# Patient Record
Sex: FEMALE | Race: WHITE | NOT HISPANIC OR LATINO | Employment: STUDENT | URBAN - METROPOLITAN AREA
[De-identification: names, ages, dates, MRNs, and addresses within clinical notes are randomized per-mention and may not be internally consistent; named-entity substitution may affect disease eponyms.]

---
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
## 2021-01-15 DIAGNOSIS — J03.01 RECURRENT STREPTOCOCCAL TONSILLITIS: Primary | ICD-10-CM

## 2021-03-19 ENCOUNTER — TELEMEDICINE (OUTPATIENT)
Dept: FAMILY MEDICINE CLINIC | Facility: CLINIC | Age: 21
End: 2021-03-19

## 2021-03-19 DIAGNOSIS — M94.0 COSTOCHONDRITIS, ACUTE: Primary | ICD-10-CM

## 2021-03-19 PROCEDURE — 99213 OFFICE O/P EST LOW 20 MIN: CPT | Performed by: FAMILY MEDICINE

## 2021-03-19 RX ORDER — CYCLOBENZAPRINE HCL 10 MG
10 TABLET ORAL 3 TIMES DAILY PRN
Qty: 90 TABLET | Refills: 0 | Status: SHIPPED | OUTPATIENT
Start: 2021-03-19 | End: 2021-06-14

## 2021-03-19 NOTE — PROGRESS NOTES
Subjective   Mayo Dietrich is a 20 y.o. female.     Chief Complaint   Patient presents with   • Chest Pain       History of Present Illness   Rib pain pain for 3 days, achy. Worse with a full breath. No recent URI. Has had this before and was diagnosed with costochondritis and this feels the same.     The following portions of the patient's history were reviewed and updated as appropriate: allergies, current medications, past family history, past medical history, past social history, past surgical history and problem list.    Past Medical History:   Diagnosis Date   • Allergic        Past Surgical History:   Procedure Laterality Date   • FOOT SURGERY     • NASAL SEPTUM SURGERY     • WISDOM TOOTH EXTRACTION         Family History   Problem Relation Age of Onset   • Hypertension Mother    • Other Mother         hemachromatosis   • No Known Problems Father        Social History     Socioeconomic History   • Marital status: Single     Spouse name: Not on file   • Number of children: Not on file   • Years of education: Not on file   • Highest education level: Not on file   Tobacco Use   • Smoking status: Passive Smoke Exposure - Never Smoker   • Smokeless tobacco: Never Used   Substance and Sexual Activity   • Alcohol use: Defer   • Drug use: Defer   • Sexual activity: Defer       Review of Systems   Constitutional: Negative for fever.   Respiratory: Negative for shortness of breath.        Objective   There were no vitals taken for this visit.  There is no height or weight on file to calculate BMI.  Physical Exam  Constitutional:       General: She is not in acute distress.     Appearance: Normal appearance.   Neurological:      General: No focal deficit present.      Mental Status: She is alert and oriented to person, place, and time.   Psychiatric:         Mood and Affect: Mood normal.         Behavior: Behavior normal.           Assessment/Plan   Diagnoses and all orders for this visit:    1. Costochondritis, acute  (Primary)  -     cyclobenzaprine (FLEXERIL) 10 MG tablet; Take 1 tablet by mouth 3 (Three) Times a Day As Needed for Muscle Spasms.  Dispense: 90 tablet; Refill: 0        Advised anti-inflammatories and to avoid risk factors.  Follow-up if worse or no better.  Consent to video visit and spent 9 minutes.

## 2021-04-16 ENCOUNTER — BULK ORDERING (OUTPATIENT)
Dept: CASE MANAGEMENT | Facility: OTHER | Age: 21
End: 2021-04-16

## 2021-04-16 DIAGNOSIS — Z23 IMMUNIZATION DUE: ICD-10-CM

## 2021-06-14 ENCOUNTER — TELEMEDICINE (OUTPATIENT)
Dept: FAMILY MEDICINE CLINIC | Facility: CLINIC | Age: 21
End: 2021-06-14

## 2021-06-14 DIAGNOSIS — J06.9 URI, ACUTE: Primary | ICD-10-CM

## 2021-06-14 PROBLEM — O14.90 PREECLAMPSIA: Status: ACTIVE | Noted: 2020-02-24

## 2021-06-14 PROBLEM — G43.719 INTRACTABLE CHRONIC MIGRAINE WITHOUT AURA AND WITHOUT STATUS MIGRAINOSUS: Status: ACTIVE | Noted: 2020-04-09

## 2021-06-14 PROCEDURE — 99213 OFFICE O/P EST LOW 20 MIN: CPT | Performed by: FAMILY MEDICINE

## 2021-06-14 RX ORDER — AMOXICILLIN 500 MG/1
1000 CAPSULE ORAL 2 TIMES DAILY
Qty: 40 CAPSULE | Refills: 0 | Status: SHIPPED | OUTPATIENT
Start: 2021-06-14 | End: 2021-07-06

## 2021-06-14 RX ORDER — BENZONATATE 200 MG/1
200 CAPSULE ORAL 3 TIMES DAILY PRN
Qty: 30 CAPSULE | Refills: 0 | Status: SHIPPED | OUTPATIENT
Start: 2021-06-14 | End: 2021-07-06

## 2021-06-14 NOTE — PROGRESS NOTES
Subjective   Mayo Dietrich is a 20 y.o. female.     Chief Complaint   Patient presents with   • URI       History of Present Illness   Cough, congestion and sinus pain for a week. Good po. Not breast feeding. Started after a sick exposure to strep. Pt has had both covid shots.     The following portions of the patient's history were reviewed and updated as appropriate: allergies, current medications, past family history, past medical history, past social history, past surgical history and problem list.    Past Medical History:   Diagnosis Date   • Allergic        Past Surgical History:   Procedure Laterality Date   • FOOT SURGERY     • NASAL SEPTUM SURGERY     • WISDOM TOOTH EXTRACTION         Family History   Problem Relation Age of Onset   • Hypertension Mother    • Other Mother         hemachromatosis   • No Known Problems Father        Social History     Socioeconomic History   • Marital status: Single     Spouse name: Not on file   • Number of children: Not on file   • Years of education: Not on file   • Highest education level: Not on file   Tobacco Use   • Smoking status: Passive Smoke Exposure - Never Smoker   • Smokeless tobacco: Never Used   Substance and Sexual Activity   • Alcohol use: Defer   • Drug use: Defer   • Sexual activity: Defer       Review of Systems   Constitutional: Negative for fever.       Objective   There were no vitals taken for this visit.  There is no height or weight on file to calculate BMI.  Physical Exam  Constitutional:       General: She is not in acute distress.     Appearance: Normal appearance.   Neurological:      General: No focal deficit present.      Mental Status: She is alert and oriented to person, place, and time.   Psychiatric:         Mood and Affect: Mood normal.         Behavior: Behavior normal.           Assessment/Plan   Diagnoses and all orders for this visit:    1. URI, acute (Primary)  -     amoxicillin (AMOXIL) 500 MG capsule; Take 2 capsules by mouth 2  (Two) Times a Day.  Dispense: 40 capsule; Refill: 0  -     benzonatate (TESSALON) 200 MG capsule; Take 1 capsule by mouth 3 (Three) Times a Day As Needed for Cough.  Dispense: 30 capsule; Refill: 0        Rest, fluids and follow up if worse or no better. Consent to video visit and spent 9 minutes.

## 2021-07-06 ENCOUNTER — TELEMEDICINE (OUTPATIENT)
Dept: FAMILY MEDICINE CLINIC | Facility: CLINIC | Age: 21
End: 2021-07-06

## 2021-07-06 DIAGNOSIS — F32.A ANXIETY AND DEPRESSION: Primary | ICD-10-CM

## 2021-07-06 DIAGNOSIS — G43.719 INTRACTABLE CHRONIC MIGRAINE WITHOUT AURA AND WITHOUT STATUS MIGRAINOSUS: ICD-10-CM

## 2021-07-06 DIAGNOSIS — F41.9 ANXIETY AND DEPRESSION: Primary | ICD-10-CM

## 2021-07-06 PROCEDURE — 99213 OFFICE O/P EST LOW 20 MIN: CPT | Performed by: FAMILY MEDICINE

## 2021-07-06 RX ORDER — FREMANEZUMAB-VFRM 225 MG/1.5ML
INJECTION SUBCUTANEOUS
COMMUNITY
Start: 2021-03-05

## 2021-07-06 NOTE — PROGRESS NOTES
Subjective   Mayo Dietrich is a 20 y.o. female.     Chief Complaint   Patient presents with   • Anxiety     refill on sertraline       hpi    had depressed moods and anxiety for months, started with pregnancy and worsening after delivery. Has help at home. Has never been on meds before we started them a year ago. Has had one episode of self harm years ago but not harming herself or others now. Today pt is doing well and does not want to stop meds yet. Her son is 1.5 years old. Working from home.     The following portions of the patient's history were reviewed and updated as appropriate: allergies, current medications, past family history, past medical history, past social history, past surgical history and problem list.    Past Medical History:   Diagnosis Date   • Allergic        Past Surgical History:   Procedure Laterality Date   • FOOT SURGERY     • NASAL SEPTUM SURGERY     • WISDOM TOOTH EXTRACTION         Family History   Problem Relation Age of Onset   • Hypertension Mother    • Other Mother         hemachromatosis   • No Known Problems Father        Social History     Socioeconomic History   • Marital status: Single     Spouse name: Not on file   • Number of children: Not on file   • Years of education: Not on file   • Highest education level: Not on file   Tobacco Use   • Smoking status: Passive Smoke Exposure - Never Smoker   • Smokeless tobacco: Never Used   Substance and Sexual Activity   • Alcohol use: Defer   • Drug use: Defer   • Sexual activity: Defer       Review of Systems   Constitutional: Negative for fever.   Respiratory: Negative for shortness of breath.    Psychiatric/Behavioral: Negative for suicidal ideas.       Objective   There were no vitals taken for this visit.  There is no height or weight on file to calculate BMI.  Physical Exam   Constitutional: She is oriented to person, place, and time. She appears well-developed and well-nourished. No distress.   Neurological: She is alert and  oriented to person, place, and time.   Psychiatric: Her behavior is normal.         Assessment/Plan   Diagnoses and all orders for this visit:    1. Anxiety and depression (Primary)  -     sertraline (ZOLOFT) 50 MG tablet; Take 1 tablet by mouth Daily.  Dispense: 90 tablet; Refill: 3    2. Intractable chronic migraine without aura and without status migrainosus      Consent to video and spent 8 minutes.  Continue medication and follow-up in 6 to 12 months.

## 2021-07-08 DIAGNOSIS — F32.A ANXIETY AND DEPRESSION: ICD-10-CM

## 2021-07-08 DIAGNOSIS — F41.9 ANXIETY AND DEPRESSION: ICD-10-CM

## 2021-09-22 ENCOUNTER — TELEMEDICINE (OUTPATIENT)
Dept: FAMILY MEDICINE CLINIC | Facility: CLINIC | Age: 21
End: 2021-09-22

## 2021-09-22 DIAGNOSIS — F41.9 ANXIETY AND DEPRESSION: ICD-10-CM

## 2021-09-22 DIAGNOSIS — F32.A ANXIETY AND DEPRESSION: ICD-10-CM

## 2021-09-22 PROCEDURE — 99214 OFFICE O/P EST MOD 30 MIN: CPT | Performed by: FAMILY MEDICINE

## 2021-09-22 RX ORDER — SERTRALINE HYDROCHLORIDE 100 MG/1
100 TABLET, FILM COATED ORAL DAILY
Qty: 90 TABLET | Refills: 1 | Status: SHIPPED | OUTPATIENT
Start: 2021-09-22 | End: 2023-03-15 | Stop reason: ALTCHOICE

## 2021-09-22 NOTE — PROGRESS NOTES
Subjective   Mayo Dietrich is a 20 y.o. female.     Chief Complaint   Patient presents with   • Depression       hpi    had depressed moods and anxiety for months, started with pregnancy and worsening after delivery. Has help at home. Has never been on meds before we started them over a year ago. Has had one episode of self harm years ago but not harming herself or others now. Today pt thinks zoloft helps some but could work better. phq-9 today is 13.     The following portions of the patient's history were reviewed and updated as appropriate: allergies, current medications, past family history, past medical history, past social history, past surgical history and problem list.    Past Medical History:   Diagnosis Date   • Allergic    • Headache        Past Surgical History:   Procedure Laterality Date   • FOOT SURGERY     • NASAL SEPTUM SURGERY     • WISDOM TOOTH EXTRACTION         Family History   Problem Relation Age of Onset   • Hypertension Mother    • Other Mother         hemachromatosis   • No Known Problems Father        Social History     Socioeconomic History   • Marital status: Single     Spouse name: Not on file   • Number of children: Not on file   • Years of education: Not on file   • Highest education level: Not on file   Tobacco Use   • Smoking status: Passive Smoke Exposure - Never Smoker   • Smokeless tobacco: Never Used   Substance and Sexual Activity   • Alcohol use: Defer   • Drug use: Defer   • Sexual activity: Defer       Review of Systems   Constitutional: Negative for fever.   Respiratory: Negative for shortness of breath.    Psychiatric/Behavioral: Negative for suicidal ideas.       Objective   There were no vitals taken for this visit.  There is no height or weight on file to calculate BMI.  Physical Exam   Constitutional: She is oriented to person, place, and time. She appears well-developed and well-nourished. No distress.   Abdominal: Normal appearance.   Neurological: She is alert and  oriented to person, place, and time.   Psychiatric: Her behavior is normal. Mood and judgment normal.         Assessment/Plan   Diagnoses and all orders for this visit:    1. Anxiety and depression  -     sertraline (ZOLOFT) 100 MG tablet; Take 1 tablet by mouth Daily.  Dispense: 90 tablet; Refill: 1      Consent to video and spent 9 minutes.  increase medication and follow-up in 1 month.  If we are not seeing some movement and symptom relief would consider adding in Wellbutrin at follow-up.  Patient is not pregnant and not planning on getting pregnant again.

## 2022-01-04 DIAGNOSIS — F32.A ANXIETY AND DEPRESSION: ICD-10-CM

## 2022-01-04 DIAGNOSIS — F41.9 ANXIETY AND DEPRESSION: ICD-10-CM

## 2022-01-05 RX ORDER — SERTRALINE HYDROCHLORIDE 100 MG/1
100 TABLET, FILM COATED ORAL DAILY
Qty: 90 TABLET | Refills: 1 | OUTPATIENT
Start: 2022-01-05

## 2022-07-07 ENCOUNTER — OFFICE VISIT (OUTPATIENT)
Dept: FAMILY MEDICINE CLINIC | Facility: CLINIC | Age: 22
End: 2022-07-07

## 2022-07-07 VITALS
BODY MASS INDEX: 26.35 KG/M2 | SYSTOLIC BLOOD PRESSURE: 100 MMHG | WEIGHT: 143.2 LBS | TEMPERATURE: 98.4 F | OXYGEN SATURATION: 98 % | HEART RATE: 98 BPM | HEIGHT: 62 IN | DIASTOLIC BLOOD PRESSURE: 71 MMHG

## 2022-07-07 DIAGNOSIS — M25.562 ACUTE PAIN OF LEFT KNEE: Primary | ICD-10-CM

## 2022-07-07 PROCEDURE — 73560 X-RAY EXAM OF KNEE 1 OR 2: CPT | Performed by: FAMILY MEDICINE

## 2022-07-07 PROCEDURE — 99214 OFFICE O/P EST MOD 30 MIN: CPT | Performed by: FAMILY MEDICINE

## 2022-07-07 RX ORDER — NITROFURANTOIN 25; 75 MG/1; MG/1
CAPSULE ORAL
COMMUNITY
Start: 2022-06-23 | End: 2022-11-30

## 2022-07-07 RX ORDER — IBUPROFEN 800 MG/1
TABLET ORAL
COMMUNITY
Start: 2022-05-12 | End: 2022-07-07

## 2022-07-07 RX ORDER — NAPROXEN 500 MG/1
500 TABLET ORAL 2 TIMES DAILY WITH MEALS
Qty: 30 TABLET | Refills: 1 | Status: SHIPPED | OUTPATIENT
Start: 2022-07-07 | End: 2022-11-30

## 2022-07-07 NOTE — PROGRESS NOTES
Subjective   Mayo Dietrich is a 21 y.o. female.     Chief Complaint   Patient presents with   • Knee Pain     Left        History of Present Illness   Pain in lt knee for 1 week, acute.  This is a new complaint  Injury 1 year ago, no recent injury  No recent trauma.  She tried some ibuprofen with some moderate relief.  But patient is very busy she has to babies at home and she constantly is on her feet  The following portions of the patient's history were reviewed and updated as appropriate: allergies, current medications, past family history, past medical history, past social history, past surgical history and problem list.    Past Medical History:   Diagnosis Date   • Allergic    • Anxiety    • Depression    • Headache    • Urinary tract infection        Past Surgical History:   Procedure Laterality Date   • FOOT SURGERY     • NASAL SEPTUM SURGERY     • SINUS SURGERY     • WISDOM TOOTH EXTRACTION         Family History   Problem Relation Age of Onset   • Hypertension Mother    • Other Mother         hemachromatosis   • Stroke Father        Social History     Socioeconomic History   • Marital status: Single   Tobacco Use   • Smoking status: Former Smoker     Years: 1.00     Types: Electronic Cigarette     Start date: 2020     Quit date: 2021     Years since quittin.0   • Smokeless tobacco: Never Used   Substance and Sexual Activity   • Alcohol use: Never   • Drug use: Never   • Sexual activity: Yes     Partners: Male     Birth control/protection: Condom, I.U.D.       Current Outpatient Medications on File Prior to Visit   Medication Sig Dispense Refill   • Fremanezumab-vfrm (Ajovy) 225 MG/1.5ML solution auto-injector      • nitrofurantoin, macrocrystal-monohydrate, (MACROBID) 100 MG capsule TAKE ONE CAPSULE BY MOUTH TWICE DAILY FOR TEN DAYS     • rizatriptan (MAXALT) 10 MG tablet Take 10 mg by mouth.     • sertraline (ZOLOFT) 100 MG tablet Take 1 tablet by mouth Daily. 90 tablet 1   • [DISCONTINUED]  ibuprofen (ADVIL,MOTRIN) 800 MG tablet        No current facility-administered medications on file prior to visit.       Review of Systems   Musculoskeletal: Positive for arthralgias.       Recent Results (from the past 4704 hour(s))   HEMOGLOBIN AND HEMATOCRIT, BLOOD    Collection Time: 02/24/22  1:52 PM    Specimen type and source: Whole Blood, Blood   Result Value Ref Range    Hemoglobin 10.7 (L) 12.0 - 16.0 g/dL    Hematocrit 33.4 (L) 36.0 - 46.0 %   HEMOGLOBIN AND HEMATOCRIT, BLOOD    Collection Time: 04/08/22 12:45 PM    Specimen type and source: Whole Blood, Blood   Result Value Ref Range    Hemoglobin 11.1 (L) 12.0 - 16.0 g/dL    Hematocrit 34.9 (L) 36.0 - 46.0 %   CBC AND DIFFERENTIAL    Collection Time: 04/29/22 11:04 AM    Specimen type and source: Whole Blood, Blood   Result Value Ref Range    WBC 8.20 4.5 - 11.0 10*3/uL    RBC 4.00 4.0 - 5.2 10*6/uL    Hemoglobin 10.6 (L) 12.0 - 16.0 g/dL    Hematocrit 34.7 (L) 36.0 - 46.0 %    MCV 86.8 80.0 - 100.0 fL    MCH 26.5 26.0 - 34.0 pg    MCHC 30.5 (L) 31.0 - 37.0 g/dL    RDW 13.2 12.0 - 16.8 %    Platelets 305 140 - 440 10*3/uL    MPV 10.1 8.4 - 12.4 fL    Differential Type Hospital CBC w/AutoDiff (arb'U) (arb'U)    Neutrophil Rel % 75.1 45 - 80 %    Lymphocyte Rel % 18.5 15 - 50 %    Monocyte Rel % 5.5 0 - 15 %    Eosinophil % 0.2 0 - 7 %    Basophil Rel % 0.2 0 - 2 %    Immature Grans % 0.5 0.0 - 1.0 %    nRBC 0 0 /100(WBC)    Neutrophils Absolute 6.15 2.0 - 8.8 10*3/uL    Lymphocytes Absolute 1.52 0.7 - 5.5 10*3/uL    Monocytes Absolute 0.45 0.0 - 1.7 10*3/uL    Eosinophils Absolute 0.02 0.0 - 0.8 10*3/uL    Basophils Absolute 0.02 0.0 - 0.2 10*3/uL    Immature Grans, Absolute 0.04 0.00 - 0.10 10*3/uL   TYPE AND SCREEN    Collection Time: 05/04/22  9:02 PM    Specimen type and source: Whole Blood, Blood   Result Value Ref Range    ABORh O Positive     Antibody Screen Negative     Crossmatch Expiration 05/07/2022,2359    TYPE AND SCREEN    Collection Time:  05/09/22  8:56 PM    Specimen type and source: Whole Blood, Blood   Result Value Ref Range    ABORh O Positive     Antibody Screen Negative     Crossmatch Expiration 05/12/2022,2359    CBC AND DIFFERENTIAL    Collection Time: 05/09/22  8:56 PM    Specimen type and source: Whole Blood, Blood   Result Value Ref Range    WBC 9.31 4.5 - 11.0 10*3/uL    RBC 3.90 (L) 4.0 - 5.2 10*6/uL    Hemoglobin 10.3 (L) 12.0 - 16.0 g/dL    Hematocrit 32.1 (L) 36.0 - 46.0 %    MCV 82.3 80.0 - 100.0 fL    MCH 26.4 26.0 - 34.0 pg    MCHC 32.1 31.0 - 37.0 g/dL    RDW 13.3 12.0 - 16.8 %    Platelets 292 140 - 440 10*3/uL    MPV 10.2 8.4 - 12.4 fL    Differential Type Hospital CBC w/AutoDiff (arb'U) (arb'U)    Neutrophil Rel % 70.4 45 - 80 %    Lymphocyte Rel % 21.6 15 - 50 %    Monocyte Rel % 7.5 0 - 15 %    Eosinophil % 0.2 0 - 7 %    Basophil Rel % 0.1 0 - 2 %    Immature Grans % 0.2 0.0 - 1.0 %    nRBC 0 0 /100(WBC)    Neutrophils Absolute 6.55 2.0 - 8.8 10*3/uL    Lymphocytes Absolute 2.01 0.7 - 5.5 10*3/uL    Monocytes Absolute 0.70 0.0 - 1.7 10*3/uL    Eosinophils Absolute 0.02 0.0 - 0.8 10*3/uL    Basophils Absolute 0.01 0.0 - 0.2 10*3/uL    Immature Grans, Absolute 0.02 0.00 - 0.10 10*3/uL   FIBRINOGEN    Collection Time: 05/09/22  9:34 PM    Specimen: Fresh Frozen Plasma    Specimen type and source: Plasma, Blood   Result Value Ref Range    Fibrinogen 612 (H) 200 - 400 mg/dL   PROTIME-INR    Collection Time: 05/09/22  9:34 PM    Specimen: Fresh Frozen Plasma    Specimen type and source: Plasma, Blood   Result Value Ref Range    Protime 11.1 10.3 - 13.3 s    INR 1.0 INR INR   APTT    Collection Time: 05/09/22  9:34 PM    Specimen: Fresh Frozen Plasma    Specimen type and source: Plasma, Blood   Result Value Ref Range    PTT 25.6 25.1 - 36.5 s   TROPONIN (IN-HOUSE)    Collection Time: 05/10/22  2:10 PM    Specimen: Fresh Frozen Plasma    Specimen type and source: Plasma, Blood   Result Value Ref Range    Troponin I <0.010 0.000 -  "0.034 ng/mL   CBC AND DIFFERENTIAL    Collection Time: 05/10/22  3:01 PM    Specimen type and source: Whole Blood, Blood   Result Value Ref Range    WBC 11.78 (H) 4.5 - 11.0 10*3/uL    RBC 4.03 4.0 - 5.2 10*6/uL    Hemoglobin 10.5 (L) 12.0 - 16.0 g/dL    Hematocrit 34.1 (L) 36.0 - 46.0 %    MCV 84.6 80.0 - 100.0 fL    MCH 26.1 26.0 - 34.0 pg    MCHC 30.8 (L) 31.0 - 37.0 g/dL    RDW 13.2 12.0 - 16.8 %    Platelets 271 140 - 440 10*3/uL    MPV 10.4 8.4 - 12.4 fL    Differential Type Hospital CBC w/AutoDiff (arb'U) (arb'U)    Neutrophil Rel % 81.4 (H) 45 - 80 %    Lymphocyte Rel % 12.6 (L) 15 - 50 %    Monocyte Rel % 5.3 0 - 15 %    Eosinophil % 0.2 0 - 7 %    Basophil Rel % 0.2 0 - 2 %    Immature Grans % 0.3 0.0 - 1.0 %    nRBC 0 0 /100(WBC)    Neutrophils Absolute 9.58 (H) 2.0 - 8.8 10*3/uL    Lymphocytes Absolute 1.49 0.7 - 5.5 10*3/uL    Monocytes Absolute 0.63 0.0 - 1.7 10*3/uL    Eosinophils Absolute 0.02 0.0 - 0.8 10*3/uL    Basophils Absolute 0.02 0.0 - 0.2 10*3/uL    Immature Grans, Absolute 0.04 0.00 - 0.10 10*3/uL   HEMOGLOBIN AND HEMATOCRIT, BLOOD    Collection Time: 05/11/22  3:49 AM    Specimen type and source: Whole Blood, Blood   Result Value Ref Range    Hemoglobin 9.6 (L) 12.0 - 16.0 g/dL    Hematocrit 30.3 (L) 36.0 - 46.0 %   TROPONIN (IN-HOUSE)    Collection Time: 05/11/22  2:10 PM    Specimen: Fresh Frozen Plasma    Specimen type and source: Plasma, Blood   Result Value Ref Range    Troponin I <0.010 0.000 - 0.034 ng/mL     Objective   Vitals:    07/07/22 0953   BP: 100/71   BP Location: Right arm   Patient Position: Sitting   Pulse: 98   Temp: 98.4 °F (36.9 °C)   SpO2: 98%   Weight: 65 kg (143 lb 3.2 oz)   Height: 157.5 cm (62.01\")     Body mass index is 26.18 kg/m².  Physical Exam  Musculoskeletal:      Comments: Knee exam is unremarkable except for the tenderness over the medial soft tissue area           Diagnoses and all orders for this visit:    1. Acute pain of left knee (Primary)  -     XR " Knee 1 or 2 View Left (In Office)  -     naproxen (Naprosyn) 500 MG tablet; Take 1 tablet by mouth 2 (Two) Times a Day With Meals.  Dispense: 30 tablet; Refill: 1  -     Ambulatory Referral to Physical Therapy Evaluate and treat      Return if symptoms worsen or fail to improve.          Answers for HPI/ROS submitted by the patient on 7/2/2022  Please describe your symptoms.: Knee pain  Have you had these symptoms before?: Yes  How long have you been having these symptoms?: 1-2 weeks  What is the primary reason for your visit?: Other

## 2022-07-11 ENCOUNTER — TELEPHONE (OUTPATIENT)
Dept: FAMILY MEDICINE CLINIC | Facility: CLINIC | Age: 22
End: 2022-07-11

## 2022-07-21 DIAGNOSIS — M25.562 ACUTE PAIN OF LEFT KNEE: Primary | ICD-10-CM

## 2022-11-30 ENCOUNTER — OFFICE VISIT (OUTPATIENT)
Dept: FAMILY MEDICINE CLINIC | Facility: CLINIC | Age: 22
End: 2022-11-30

## 2022-11-30 VITALS
BODY MASS INDEX: 26.02 KG/M2 | OXYGEN SATURATION: 99 % | HEART RATE: 84 BPM | RESPIRATION RATE: 20 BRPM | SYSTOLIC BLOOD PRESSURE: 126 MMHG | TEMPERATURE: 97.7 F | WEIGHT: 141.4 LBS | DIASTOLIC BLOOD PRESSURE: 82 MMHG | HEIGHT: 62 IN

## 2022-11-30 DIAGNOSIS — R06.09 DYSPNEA ON EXERTION: Primary | ICD-10-CM

## 2022-11-30 PROCEDURE — 99214 OFFICE O/P EST MOD 30 MIN: CPT | Performed by: FAMILY MEDICINE

## 2022-11-30 RX ORDER — NORETHINDRONE ACETATE AND ETHINYL ESTRADIOL, ETHINYL ESTRADIOL AND FERROUS FUMARATE 1MG-10(24)
1 KIT ORAL DAILY
COMMUNITY
Start: 2022-09-28 | End: 2022-11-30

## 2022-11-30 RX ORDER — PANTOPRAZOLE SODIUM 40 MG/1
40 TABLET, DELAYED RELEASE ORAL DAILY
COMMUNITY
Start: 2022-11-07 | End: 2023-03-15 | Stop reason: ALTCHOICE

## 2022-11-30 NOTE — PROGRESS NOTES
Subjective   Mayo Dietrich is a 22 y.o. female.     Chief Complaint   Patient presents with   • Chest Pain     2 months    • Shortness of Breath       History of Present Illness   Patient is having sharp stabbing chest pain whenever she exercises.  Did go to the ER.  Records reviewed.  Patient has also had some shortness of breath.  She was previously diagnosed with costochondritis.  At the ER they seem to think this was pleurisy.  Her pain has been associated with breathing especially heavily.  In the ER they did a CT angiogram chest and ruled out PE.  Patient had a normal EKG.  She has no known risk factors for cardiac events.  Non-smoker. Did have covid before this started. Other covid symptoms resolved.     The following portions of the patient's history were reviewed and updated as appropriate: allergies, current medications, past family history, past medical history, past social history, past surgical history and problem list.    Past Medical History:   Diagnosis Date   • Allergic    • Anxiety    • Depression    • GERD (gastroesophageal reflux disease)    • Headache    • Urinary tract infection        Past Surgical History:   Procedure Laterality Date   • FOOT SURGERY     • NASAL SEPTUM SURGERY     • SINUS SURGERY     • WISDOM TOOTH EXTRACTION         Family History   Problem Relation Age of Onset   • Hypertension Mother    • Other Mother         hemachromatosis   • Stroke Father        Social History     Socioeconomic History   • Marital status: Single   Tobacco Use   • Smoking status: Former     Types: Electronic Cigarette     Start date: 2020     Quit date: 2021     Years since quittin.4   • Smokeless tobacco: Never   Substance and Sexual Activity   • Alcohol use: Never   • Drug use: Never   • Sexual activity: Yes     Partners: Male     Birth control/protection: Condom, Pill       Review of Systems   Constitutional: Negative for fever.   Cardiovascular: Negative for chest pain and palpitations.  "      Objective   Visit Vitals  /82 (BP Location: Left arm, Patient Position: Sitting, Cuff Size: Adult)   Pulse 84   Temp 97.7 °F (36.5 °C) (Temporal)   Resp 20   Ht 157.5 cm (62.01\")   Wt 64.1 kg (141 lb 6.4 oz)   SpO2 99%   BMI 25.86 kg/m²     Body mass index is 25.86 kg/m².  Physical Exam  Constitutional:       Appearance: Normal appearance. She is well-developed.   Cardiovascular:      Rate and Rhythm: Normal rate and regular rhythm.      Heart sounds: Normal heart sounds.   Pulmonary:      Effort: Pulmonary effort is normal.      Breath sounds: Normal breath sounds.   Musculoskeletal:         General: No swelling. Normal range of motion.   Skin:     General: Skin is warm and dry.      Findings: No rash.   Neurological:      General: No focal deficit present.      Mental Status: She is alert and oriented to person, place, and time.   Psychiatric:         Mood and Affect: Mood normal.         Behavior: Behavior normal.           Assessment & Plan   Diagnoses and all orders for this visit:    1. Dyspnea on exertion (Primary)  -     Adult Transthoracic Echo Complete W/ Cont if Necessary Per Protocol        Will get the echo to rule out any covid changes to her heart. F/U if worse or no better.        "

## 2022-12-22 ENCOUNTER — HOSPITAL ENCOUNTER (OUTPATIENT)
Dept: CARDIOLOGY | Facility: HOSPITAL | Age: 22
Discharge: HOME | End: 2022-12-22
Admitting: FAMILY MEDICINE

## 2022-12-22 VITALS
HEIGHT: 62 IN | DIASTOLIC BLOOD PRESSURE: 60 MMHG | BODY MASS INDEX: 25.95 KG/M2 | HEART RATE: 94 BPM | WEIGHT: 141 LBS | SYSTOLIC BLOOD PRESSURE: 118 MMHG

## 2022-12-22 LAB
AORTIC ARCH: 1.8 CM
ASCENDING AORTA: 2.2 CM
BH CV ECHO MEAS - ACS: 1.51 CM
BH CV ECHO MEAS - AO MAX PG: 5.6 MMHG
BH CV ECHO MEAS - AO MEAN PG: 3.2 MMHG
BH CV ECHO MEAS - AO ROOT DIAM: 2.49 CM
BH CV ECHO MEAS - AO V2 MAX: 118.3 CM/SEC
BH CV ECHO MEAS - AO V2 VTI: 21.7 CM
BH CV ECHO MEAS - AVA(I,D): 2.36 CM2
BH CV ECHO MEAS - EDV(CUBED): 61.3 ML
BH CV ECHO MEAS - EDV(MOD-SP2): 56 ML
BH CV ECHO MEAS - EDV(MOD-SP4): 56 ML
BH CV ECHO MEAS - EF(MOD-BP): 62.7 %
BH CV ECHO MEAS - EF(MOD-SP2): 64.3 %
BH CV ECHO MEAS - EF(MOD-SP4): 58.9 %
BH CV ECHO MEAS - ESV(CUBED): 14.3 ML
BH CV ECHO MEAS - ESV(MOD-SP2): 20 ML
BH CV ECHO MEAS - ESV(MOD-SP4): 23 ML
BH CV ECHO MEAS - FS: 38.4 %
BH CV ECHO MEAS - IVS/LVPW: 0.86 CM
BH CV ECHO MEAS - IVSD: 0.66 CM
BH CV ECHO MEAS - LAT PEAK E' VEL: 19.9 CM/SEC
BH CV ECHO MEAS - LV DIASTOLIC VOL/BSA (35-75): 34 CM2
BH CV ECHO MEAS - LV MASS(C)D: 78.6 GRAMS
BH CV ECHO MEAS - LV MAX PG: 4 MMHG
BH CV ECHO MEAS - LV MEAN PG: 2.5 MMHG
BH CV ECHO MEAS - LV SYSTOLIC VOL/BSA (12-30): 14 CM2
BH CV ECHO MEAS - LV V1 MAX: 100.3 CM/SEC
BH CV ECHO MEAS - LV V1 VTI: 17.9 CM
BH CV ECHO MEAS - LVIDD: 3.9 CM
BH CV ECHO MEAS - LVIDS: 2.43 CM
BH CV ECHO MEAS - LVOT AREA: 2.9 CM2
BH CV ECHO MEAS - LVOT DIAM: 1.91 CM
BH CV ECHO MEAS - LVPWD: 0.77 CM
BH CV ECHO MEAS - MED PEAK E' VEL: 16.3 CM/SEC
BH CV ECHO MEAS - MV A DUR: 0.15 SEC
BH CV ECHO MEAS - MV A MAX VEL: 65.1 CM/SEC
BH CV ECHO MEAS - MV DEC SLOPE: 514.4 CM/SEC2
BH CV ECHO MEAS - MV DEC TIME: 0.09 MSEC
BH CV ECHO MEAS - MV E MAX VEL: 87 CM/SEC
BH CV ECHO MEAS - MV E/A: 1.34
BH CV ECHO MEAS - MV MAX PG: 4.1 MMHG
BH CV ECHO MEAS - MV MEAN PG: 1.97 MMHG
BH CV ECHO MEAS - MV P1/2T: 60.3 MSEC
BH CV ECHO MEAS - MV V2 VTI: 19.9 CM
BH CV ECHO MEAS - MVA(P1/2T): 3.6 CM2
BH CV ECHO MEAS - MVA(VTI): 2.6 CM2
BH CV ECHO MEAS - PA ACC TIME: 0.1 SEC
BH CV ECHO MEAS - PA PR(ACCEL): 35 MMHG
BH CV ECHO MEAS - PA V2 MAX: 107.2 CM/SEC
BH CV ECHO MEAS - PI END-D VEL: 107.2 CM/SEC
BH CV ECHO MEAS - PULM A REVS DUR: 0.12 SEC
BH CV ECHO MEAS - PULM A REVS VEL: 29.6 CM/SEC
BH CV ECHO MEAS - PULM DIAS VEL: 57 CM/SEC
BH CV ECHO MEAS - PULM S/D: 0.76
BH CV ECHO MEAS - PULM SYS VEL: 43.3 CM/SEC
BH CV ECHO MEAS - QP/QS: 0.61
BH CV ECHO MEAS - RAP SYSTOLE: 3 MMHG
BH CV ECHO MEAS - RV MAX PG: 1.76 MMHG
BH CV ECHO MEAS - RV V1 MAX: 66.4 CM/SEC
BH CV ECHO MEAS - RV V1 VTI: 11.1 CM
BH CV ECHO MEAS - RVOT DIAM: 1.88 CM
BH CV ECHO MEAS - RVSP: 10.5 MMHG
BH CV ECHO MEAS - SI(MOD-SP2): 21.8 ML/M2
BH CV ECHO MEAS - SI(MOD-SP4): 20 ML/M2
BH CV ECHO MEAS - SUP REN AO DIAM: 1.7 CM
BH CV ECHO MEAS - SV(LVOT): 51.3 ML
BH CV ECHO MEAS - SV(MOD-SP2): 36 ML
BH CV ECHO MEAS - SV(MOD-SP4): 33 ML
BH CV ECHO MEAS - SV(RVOT): 31 ML
BH CV ECHO MEAS - TAPSE (>1.6): 2.6 CM
BH CV ECHO MEAS - TR MAX PG: 7.5 MMHG
BH CV ECHO MEAS - TR MAX VEL: 136.7 CM/SEC
BH CV ECHO MEASUREMENTS AVERAGE E/E' RATIO: 4.81
BH CV XLRA - RV BASE: 3.1 CM
BH CV XLRA - RV LENGTH: 6.3 CM
BH CV XLRA - RV MID: 2.34 CM
BH CV XLRA - TDI S': 14.8 CM/SEC
LEFT ATRIUM VOLUME INDEX: 8.6 ML/M2
MAXIMAL PREDICTED HEART RATE: 199 BPM
SINUS: 2.21 CM
STJ: 2.03 CM
STRESS TARGET HR: 169 BPM

## 2022-12-22 PROCEDURE — 93306 TTE W/DOPPLER COMPLETE: CPT | Performed by: INTERNAL MEDICINE

## 2022-12-22 PROCEDURE — 93306 TTE W/DOPPLER COMPLETE: CPT

## 2023-03-15 ENCOUNTER — OFFICE VISIT (OUTPATIENT)
Dept: FAMILY MEDICINE CLINIC | Facility: CLINIC | Age: 23
End: 2023-03-15
Payer: COMMERCIAL

## 2023-03-15 VITALS
WEIGHT: 135.2 LBS | OXYGEN SATURATION: 99 % | TEMPERATURE: 97.5 F | BODY MASS INDEX: 24.88 KG/M2 | HEIGHT: 62 IN | HEART RATE: 66 BPM | DIASTOLIC BLOOD PRESSURE: 80 MMHG | SYSTOLIC BLOOD PRESSURE: 108 MMHG

## 2023-03-15 DIAGNOSIS — F41.9 ANXIETY AND DEPRESSION: ICD-10-CM

## 2023-03-15 DIAGNOSIS — K59.09 CONSTIPATION, CHRONIC: Primary | ICD-10-CM

## 2023-03-15 DIAGNOSIS — F32.A ANXIETY AND DEPRESSION: ICD-10-CM

## 2023-03-15 DIAGNOSIS — K58.1 IRRITABLE BOWEL SYNDROME WITH CONSTIPATION: ICD-10-CM

## 2023-03-15 PROCEDURE — 1159F MED LIST DOCD IN RCRD: CPT | Performed by: FAMILY MEDICINE

## 2023-03-15 PROCEDURE — 99214 OFFICE O/P EST MOD 30 MIN: CPT | Performed by: FAMILY MEDICINE

## 2023-03-15 PROCEDURE — 1160F RVW MEDS BY RX/DR IN RCRD: CPT | Performed by: FAMILY MEDICINE

## 2023-03-15 RX ORDER — VENLAFAXINE HYDROCHLORIDE 75 MG/1
75 CAPSULE, EXTENDED RELEASE ORAL DAILY
Qty: 90 CAPSULE | Refills: 1 | Status: SHIPPED | OUTPATIENT
Start: 2023-03-15

## 2023-03-15 RX ORDER — NORETHINDRONE ACETATE AND ETHINYL ESTRADIOL, ETHINYL ESTRADIOL AND FERROUS FUMARATE 1MG-10(24)
1 KIT ORAL DAILY
COMMUNITY
Start: 2023-03-01

## 2023-03-15 RX ORDER — RIMEGEPANT SULFATE 75 MG/75MG
TABLET, ORALLY DISINTEGRATING ORAL
COMMUNITY
Start: 2023-02-16

## 2023-03-15 NOTE — PROGRESS NOTES
Subjective   Mayo Dietrich is a 22 y.o. female.     Chief Complaint   Patient presents with   • Constipation     2 months has tried miralax, laxatives and increasing water intake       History of Present Illness   Patient has had constipation for 2 months.  MiraLAX and laxatives have not offered much relief.  She has tried increasing her water and fiber intake. Does not have BM for about a week and then has several BM in a row. They are hard. Has had this on and off for years but worse for 2 months. Wanting to try something else. Has been to GI before and told she has constipation IBS.     Having depression and anxiety- zoloft made her too nauseated. Wanting to try something else. Having depressed mood, mood swings, anger, fatigue.     The following portions of the patient's history were reviewed and updated as appropriate: allergies, current medications, past family history, past medical history, past social history, past surgical history and problem list.    Past Medical History:   Diagnosis Date   • Allergic    • Anxiety    • Depression    • GERD (gastroesophageal reflux disease)    • Headache    • Urinary tract infection        Past Surgical History:   Procedure Laterality Date   • FOOT SURGERY     • NASAL SEPTUM SURGERY     • SINUS SURGERY     • WISDOM TOOTH EXTRACTION         Family History   Problem Relation Age of Onset   • Hypertension Mother    • Other Mother         hemachromatosis   • Stroke Father        Social History     Socioeconomic History   • Marital status: Single   Tobacco Use   • Smoking status: Former     Types: Electronic Cigarette     Start date: 2020     Quit date: 2021     Years since quittin.7   • Smokeless tobacco: Never   Substance and Sexual Activity   • Alcohol use: Never   • Drug use: Never   • Sexual activity: Yes     Partners: Male     Birth control/protection: Condom, Pill       Review of Systems   Constitutional: Negative for fever.   Gastrointestinal: Negative for  "blood in stool.       Objective   Visit Vitals  /80 (BP Location: Left arm, Patient Position: Sitting)   Pulse 66   Temp 97.5 °F (36.4 °C)   Ht 157.5 cm (62.01\")   Wt 61.3 kg (135 lb 3.2 oz)   LMP 03/13/2023   SpO2 99%   Breastfeeding No   BMI 24.72 kg/m²     Body mass index is 24.72 kg/m².  Physical Exam  Constitutional:       Appearance: Normal appearance. She is well-developed.   Cardiovascular:      Rate and Rhythm: Normal rate and regular rhythm.      Heart sounds: Normal heart sounds.   Pulmonary:      Effort: Pulmonary effort is normal.      Breath sounds: Normal breath sounds.   Abdominal:      General: There is no distension.      Tenderness: There is no abdominal tenderness. There is no guarding.   Musculoskeletal:         General: No swelling. Normal range of motion.   Skin:     General: Skin is warm and dry.      Findings: No rash.   Neurological:      General: No focal deficit present.      Mental Status: She is alert and oriented to person, place, and time.   Psychiatric:         Mood and Affect: Mood normal.         Behavior: Behavior normal.           Assessment & Plan   Diagnoses and all orders for this visit:    1. Constipation, chronic (Primary)  -     Discontinue: linaclotide (Linzess) 72 MCG capsule capsule; Take 1 capsule by mouth Every Morning Before Breakfast.  Dispense: 90 capsule; Refill: 1  -     linaclotide (Linzess) 72 MCG capsule capsule; Take 1 capsule by mouth Every Morning Before Breakfast.  Dispense: 90 capsule; Refill: 3    2. Irritable bowel syndrome with constipation  -     Discontinue: linaclotide (Linzess) 72 MCG capsule capsule; Take 1 capsule by mouth Every Morning Before Breakfast.  Dispense: 90 capsule; Refill: 1  -     linaclotide (Linzess) 72 MCG capsule capsule; Take 1 capsule by mouth Every Morning Before Breakfast.  Dispense: 90 capsule; Refill: 3    3. Anxiety and depression  -     venlafaxine XR (Effexor XR) 75 MG 24 hr capsule; Take 1 capsule by mouth Daily.  " Dispense: 90 capsule; Refill: 1        Discussed risks and benefits and use. F/U in 1 month.

## 2023-05-11 ENCOUNTER — HOSPITAL ENCOUNTER (EMERGENCY)
Facility: HOSPITAL | Age: 23
Discharge: HOME | End: 2023-05-11
Attending: EMERGENCY MEDICINE | Admitting: EMERGENCY MEDICINE
Payer: COMMERCIAL

## 2023-05-11 ENCOUNTER — APPOINTMENT (OUTPATIENT)
Dept: ULTRASOUND IMAGING | Facility: HOSPITAL | Age: 23
End: 2023-05-11
Payer: COMMERCIAL

## 2023-05-11 VITALS
TEMPERATURE: 97.4 F | HEIGHT: 62 IN | WEIGHT: 140 LBS | BODY MASS INDEX: 25.76 KG/M2 | HEART RATE: 81 BPM | SYSTOLIC BLOOD PRESSURE: 99 MMHG | RESPIRATION RATE: 16 BRPM | OXYGEN SATURATION: 99 % | DIASTOLIC BLOOD PRESSURE: 73 MMHG

## 2023-05-11 DIAGNOSIS — R10.13 EPIGASTRIC ABDOMINAL PAIN: Primary | ICD-10-CM

## 2023-05-11 DIAGNOSIS — R11.0 NAUSEA: ICD-10-CM

## 2023-05-11 LAB
ALBUMIN SERPL-MCNC: 5 G/DL (ref 3.5–5.2)
ALBUMIN/GLOB SERPL: 1.8 G/DL
ALP SERPL-CCNC: 98 U/L (ref 39–117)
ALT SERPL W P-5'-P-CCNC: 9 U/L (ref 1–33)
ANION GAP SERPL CALCULATED.3IONS-SCNC: 10 MMOL/L (ref 5–15)
AST SERPL-CCNC: 16 U/L (ref 1–32)
BACTERIA UR QL AUTO: ABNORMAL /HPF
BASOPHILS # BLD AUTO: 0.02 10*3/MM3 (ref 0–0.2)
BASOPHILS NFR BLD AUTO: 0.3 % (ref 0–1.5)
BILIRUB SERPL-MCNC: 0.6 MG/DL (ref 0–1.2)
BILIRUB UR QL STRIP: NEGATIVE
BUN SERPL-MCNC: 7 MG/DL (ref 6–20)
BUN/CREAT SERPL: 12.3 (ref 7–25)
CALCIUM SPEC-SCNC: 9.1 MG/DL (ref 8.6–10.5)
CHLORIDE SERPL-SCNC: 107 MMOL/L (ref 98–107)
CLARITY UR: ABNORMAL
CO2 SERPL-SCNC: 23 MMOL/L (ref 22–29)
COLOR UR: YELLOW
CREAT SERPL-MCNC: 0.57 MG/DL (ref 0.57–1)
DEPRECATED RDW RBC AUTO: 42.6 FL (ref 37–54)
EGFRCR SERPLBLD CKD-EPI 2021: 132.8 ML/MIN/1.73
EOSINOPHIL # BLD AUTO: 0.04 10*3/MM3 (ref 0–0.4)
EOSINOPHIL NFR BLD AUTO: 0.7 % (ref 0.3–6.2)
ERYTHROCYTE [DISTWIDTH] IN BLOOD BY AUTOMATED COUNT: 13.1 % (ref 12.3–15.4)
GLOBULIN UR ELPH-MCNC: 2.8 GM/DL
GLUCOSE SERPL-MCNC: 84 MG/DL (ref 65–99)
GLUCOSE UR STRIP-MCNC: NEGATIVE MG/DL
HCG SERPL QL: NEGATIVE
HCT VFR BLD AUTO: 41.3 % (ref 34–46.6)
HGB BLD-MCNC: 13.6 G/DL (ref 12–15.9)
HGB UR QL STRIP.AUTO: NEGATIVE
HOLD SPECIMEN: NORMAL
HYALINE CASTS UR QL AUTO: ABNORMAL /LPF
IMM GRANULOCYTES # BLD AUTO: 0.01 10*3/MM3 (ref 0–0.05)
IMM GRANULOCYTES NFR BLD AUTO: 0.2 % (ref 0–0.5)
KETONES UR QL STRIP: NEGATIVE
LEUKOCYTE ESTERASE UR QL STRIP.AUTO: ABNORMAL
LIPASE SERPL-CCNC: 20 U/L (ref 13–60)
LYMPHOCYTES # BLD AUTO: 1.35 10*3/MM3 (ref 0.7–3.1)
LYMPHOCYTES NFR BLD AUTO: 23.5 % (ref 19.6–45.3)
MCH RBC QN AUTO: 28.8 PG (ref 26.6–33)
MCHC RBC AUTO-ENTMCNC: 32.9 G/DL (ref 31.5–35.7)
MCV RBC AUTO: 87.5 FL (ref 79–97)
MONOCYTES # BLD AUTO: 0.43 10*3/MM3 (ref 0.1–0.9)
MONOCYTES NFR BLD AUTO: 7.5 % (ref 5–12)
MUCOUS THREADS URNS QL MICRO: ABNORMAL /HPF
NEUTROPHILS NFR BLD AUTO: 3.89 10*3/MM3 (ref 1.7–7)
NEUTROPHILS NFR BLD AUTO: 67.8 % (ref 42.7–76)
NITRITE UR QL STRIP: NEGATIVE
NRBC BLD AUTO-RTO: 0 /100 WBC (ref 0–0.2)
PH UR STRIP.AUTO: 6 [PH] (ref 5–8)
PLATELET # BLD AUTO: 287 10*3/MM3 (ref 140–450)
PMV BLD AUTO: 9.4 FL (ref 6–12)
POTASSIUM SERPL-SCNC: 3.8 MMOL/L (ref 3.5–5.2)
PROT SERPL-MCNC: 7.8 G/DL (ref 6–8.5)
PROT UR QL STRIP: ABNORMAL
RBC # BLD AUTO: 4.72 10*6/MM3 (ref 3.77–5.28)
RBC # UR STRIP: ABNORMAL /HPF
REF LAB TEST METHOD: ABNORMAL
SODIUM SERPL-SCNC: 140 MMOL/L (ref 136–145)
SP GR UR STRIP: 1.02 (ref 1–1.03)
SQUAMOUS #/AREA URNS HPF: ABNORMAL /HPF
UROBILINOGEN UR QL STRIP: ABNORMAL
WBC # UR STRIP: ABNORMAL /HPF
WBC NRBC COR # BLD: 5.74 10*3/MM3 (ref 3.4–10.8)
WHOLE BLOOD HOLD COAG: NORMAL
WHOLE BLOOD HOLD SPECIMEN: NORMAL

## 2023-05-11 PROCEDURE — 76705 ECHO EXAM OF ABDOMEN: CPT

## 2023-05-11 PROCEDURE — 85025 COMPLETE CBC W/AUTO DIFF WBC: CPT | Performed by: EMERGENCY MEDICINE

## 2023-05-11 PROCEDURE — 83690 ASSAY OF LIPASE: CPT | Performed by: EMERGENCY MEDICINE

## 2023-05-11 PROCEDURE — 96375 TX/PRO/DX INJ NEW DRUG ADDON: CPT

## 2023-05-11 PROCEDURE — 81001 URINALYSIS AUTO W/SCOPE: CPT | Performed by: EMERGENCY MEDICINE

## 2023-05-11 PROCEDURE — 25010000002 MORPHINE PER 10 MG: Performed by: EMERGENCY MEDICINE

## 2023-05-11 PROCEDURE — 25010000002 ONDANSETRON PER 1 MG: Performed by: EMERGENCY MEDICINE

## 2023-05-11 PROCEDURE — 96374 THER/PROPH/DIAG INJ IV PUSH: CPT

## 2023-05-11 PROCEDURE — 84703 CHORIONIC GONADOTROPIN ASSAY: CPT | Performed by: EMERGENCY MEDICINE

## 2023-05-11 PROCEDURE — 99283 EMERGENCY DEPT VISIT LOW MDM: CPT

## 2023-05-11 PROCEDURE — 80053 COMPREHEN METABOLIC PANEL: CPT | Performed by: EMERGENCY MEDICINE

## 2023-05-11 RX ORDER — ONDANSETRON 4 MG/1
4 TABLET, ORALLY DISINTEGRATING ORAL 4 TIMES DAILY PRN
Qty: 15 TABLET | Refills: 0 | Status: SHIPPED | OUTPATIENT
Start: 2023-05-11

## 2023-05-11 RX ORDER — MORPHINE SULFATE 2 MG/ML
4 INJECTION, SOLUTION INTRAMUSCULAR; INTRAVENOUS ONCE
Status: COMPLETED | OUTPATIENT
Start: 2023-05-11 | End: 2023-05-11

## 2023-05-11 RX ORDER — PANTOPRAZOLE SODIUM 40 MG/1
40 TABLET, DELAYED RELEASE ORAL DAILY
Qty: 30 TABLET | Refills: 0 | Status: SHIPPED | OUTPATIENT
Start: 2023-05-11

## 2023-05-11 RX ORDER — ONDANSETRON 2 MG/ML
4 INJECTION INTRAMUSCULAR; INTRAVENOUS ONCE
Status: COMPLETED | OUTPATIENT
Start: 2023-05-11 | End: 2023-05-11

## 2023-05-11 RX ORDER — SODIUM CHLORIDE 0.9 % (FLUSH) 0.9 %
10 SYRINGE (ML) INJECTION AS NEEDED
Status: DISCONTINUED | OUTPATIENT
Start: 2023-05-11 | End: 2023-05-11 | Stop reason: HOSPADM

## 2023-05-11 RX ORDER — SUCRALFATE 1 G/1
1 TABLET ORAL 4 TIMES DAILY
Qty: 60 TABLET | Refills: 0 | Status: SHIPPED | OUTPATIENT
Start: 2023-05-11

## 2023-05-11 RX ADMIN — MORPHINE SULFATE 4 MG: 2 INJECTION, SOLUTION INTRAMUSCULAR; INTRAVENOUS at 12:33

## 2023-05-11 RX ADMIN — SODIUM CHLORIDE 1000 ML: 9 INJECTION, SOLUTION INTRAVENOUS at 12:31

## 2023-05-11 RX ADMIN — ONDANSETRON 4 MG: 2 INJECTION INTRAMUSCULAR; INTRAVENOUS at 12:33

## 2023-05-11 NOTE — ED PROVIDER NOTES
EMERGENCY DEPARTMENT ENCOUNTER    Room Number:    Date of encounter:  2023  PCP: Dorothy Carranza MD  Historian: Patient, father  Chronic or social conditions impacting care (social determinants of health): Nothing      I used full protective equipment while examining this patient.  This includes face mask, gloves and protective eyewear.  I washed my hands before entering the room and immediately upon leaving the room      HPI:  Chief Complaint: Abdominal pain  A complete HPI/ROS/PMH/PSH/SH/FH are unobtainable due to: Nothing    Context: Mayo Dietrich is a 22 y.o. female who presents to the ED c/o 2-day history of intermittent right upper quadrant abdominal pain.  Patient reports the pain started yesterday morning while eating.  She reports that shortly after eating she developed achy, stabbing right upper quadrant abdominal pain.  The pain was accompanied with nausea, without true vomiting.  After approximately an hour later the pain eventually resolved.  The symptoms again returned shortly after eating lunch and then again after dinner.  The pain has never really gone away since last night.  The pain is localized to the right upper quadrant with mild radiation through to the back.    She also reports chronic constipation over several months.  She has been followed by her primary care physician who has prescribed her Linzess.    Review of prior external notes (non-ED):   I reviewed primary care office visit from 3/15/2023.  Patient being followed for constipation, IBS.    Review of prior external test results outside of this encounter:  Reviewed his CBC from 2022.  Hemoglobin 12.9.    PAST MEDICAL HISTORY  Active Ambulatory Problems     Diagnosis Date Noted   • URI, acute 2017   • Healthcare maintenance 2019   • Intrauterine pregnancy in teenager 2019   • Intractable chronic migraine without aura and without status migrainosus 2020   • Preeclampsia 2020   •   (spontaneous vaginal delivery) 2020   • Anxiety and depression 2021   • Dyspnea on exertion 2022   • Constipation, chronic 03/15/2023   • Irritable bowel syndrome with constipation 03/15/2023     Resolved Ambulatory Problems     Diagnosis Date Noted   • No Resolved Ambulatory Problems     Past Medical History:   Diagnosis Date   • Allergic    • Anxiety    • Depression    • GERD (gastroesophageal reflux disease)    • Headache    • Urinary tract infection          PAST SURGICAL HISTORY  Past Surgical History:   Procedure Laterality Date   • FOOT SURGERY     • NASAL SEPTUM SURGERY     • SINUS SURGERY     • WISDOM TOOTH EXTRACTION           FAMILY HISTORY  Family History   Problem Relation Age of Onset   • Hypertension Mother    • Other Mother         hemachromatosis   • Stroke Father          SOCIAL HISTORY  Social History     Socioeconomic History   • Marital status: Single   Tobacco Use   • Smoking status: Former     Types: Electronic Cigarette     Start date: 2020     Quit date: 2021     Years since quittin.8   • Smokeless tobacco: Never   Substance and Sexual Activity   • Alcohol use: Never   • Drug use: Never   • Sexual activity: Yes     Partners: Male     Birth control/protection: Condom, Pill         ALLERGIES  Patient has no known allergies.        REVIEW OF SYSTEMS  All systems reviewed and negative except for those discussed in HPI.       PHYSICAL EXAM    I have reviewed the triage vital signs and nursing notes.    ED Triage Vitals   Temp Heart Rate Resp BP SpO2   23 1029 23 1029 23 1029 23 1032 23 1029   97.4 °F (36.3 °C) 92 16 122/87 99 %      Temp src Heart Rate Source Patient Position BP Location FiO2 (%)   23 1029 -- -- -- --   Tympanic           Physical Exam  GENERAL: Alert, oriented, nontoxic-appearing, not distressed  HENT: head atraumatic, no nuchal rigidity  EYES: no scleral icterus, EOMI  CV: regular rhythm, regular rate, no  murmur  RESPIRATORY: normal effort, CTA  ABDOMEN: soft, mild right upper quadrant abdominal and epigastric tenderness.  Negative Munoz sign.  MUSCULOSKELETAL: no deformity, FROM, no calf swelling or tenderness  NEURO: alert, moves all extremities, follows commands  SKIN: warm, dry        LAB RESULTS  Recent Results (from the past 24 hour(s))   Comprehensive Metabolic Panel    Collection Time: 05/11/23 11:55 AM    Specimen: Blood   Result Value Ref Range    Glucose 84 65 - 99 mg/dL    BUN 7 6 - 20 mg/dL    Creatinine 0.57 0.57 - 1.00 mg/dL    Sodium 140 136 - 145 mmol/L    Potassium 3.8 3.5 - 5.2 mmol/L    Chloride 107 98 - 107 mmol/L    CO2 23.0 22.0 - 29.0 mmol/L    Calcium 9.1 8.6 - 10.5 mg/dL    Total Protein 7.8 6.0 - 8.5 g/dL    Albumin 5.0 3.5 - 5.2 g/dL    ALT (SGPT) 9 1 - 33 U/L    AST (SGOT) 16 1 - 32 U/L    Alkaline Phosphatase 98 39 - 117 U/L    Total Bilirubin 0.6 0.0 - 1.2 mg/dL    Globulin 2.8 gm/dL    A/G Ratio 1.8 g/dL    BUN/Creatinine Ratio 12.3 7.0 - 25.0    Anion Gap 10.0 5.0 - 15.0 mmol/L    eGFR 132.8 >60.0 mL/min/1.73   Lipase    Collection Time: 05/11/23 11:55 AM    Specimen: Blood   Result Value Ref Range    Lipase 20 13 - 60 U/L   hCG, Serum, Qualitative    Collection Time: 05/11/23 11:55 AM    Specimen: Blood   Result Value Ref Range    HCG Qualitative Negative Negative   Green Top (Gel)    Collection Time: 05/11/23 11:55 AM   Result Value Ref Range    Extra Tube Hold for add-ons.    Lavender Top    Collection Time: 05/11/23 11:55 AM   Result Value Ref Range    Extra Tube hold for add-on    Light Blue Top    Collection Time: 05/11/23 11:55 AM   Result Value Ref Range    Extra Tube Hold for add-ons.    CBC Auto Differential    Collection Time: 05/11/23 11:55 AM    Specimen: Blood   Result Value Ref Range    WBC 5.74 3.40 - 10.80 10*3/mm3    RBC 4.72 3.77 - 5.28 10*6/mm3    Hemoglobin 13.6 12.0 - 15.9 g/dL    Hematocrit 41.3 34.0 - 46.6 %    MCV 87.5 79.0 - 97.0 fL    MCH 28.8 26.6 - 33.0  pg    MCHC 32.9 31.5 - 35.7 g/dL    RDW 13.1 12.3 - 15.4 %    RDW-SD 42.6 37.0 - 54.0 fl    MPV 9.4 6.0 - 12.0 fL    Platelets 287 140 - 450 10*3/mm3    Neutrophil % 67.8 42.7 - 76.0 %    Lymphocyte % 23.5 19.6 - 45.3 %    Monocyte % 7.5 5.0 - 12.0 %    Eosinophil % 0.7 0.3 - 6.2 %    Basophil % 0.3 0.0 - 1.5 %    Immature Grans % 0.2 0.0 - 0.5 %    Neutrophils, Absolute 3.89 1.70 - 7.00 10*3/mm3    Lymphocytes, Absolute 1.35 0.70 - 3.10 10*3/mm3    Monocytes, Absolute 0.43 0.10 - 0.90 10*3/mm3    Eosinophils, Absolute 0.04 0.00 - 0.40 10*3/mm3    Basophils, Absolute 0.02 0.00 - 0.20 10*3/mm3    Immature Grans, Absolute 0.01 0.00 - 0.05 10*3/mm3    nRBC 0.0 0.0 - 0.2 /100 WBC   Urinalysis With Microscopic If Indicated (No Culture) - Urine, Clean Catch    Collection Time: 05/11/23 12:33 PM    Specimen: Urine, Clean Catch   Result Value Ref Range    Color, UA Yellow Yellow, Straw    Appearance, UA Cloudy (A) Clear    pH, UA 6.0 5.0 - 8.0    Specific Gravity, UA 1.022 1.005 - 1.030    Glucose, UA Negative Negative    Ketones, UA Negative Negative    Bilirubin, UA Negative Negative    Blood, UA Negative Negative    Protein, UA 30 mg/dL (1+) (A) Negative    Leuk Esterase, UA Trace (A) Negative    Nitrite, UA Negative Negative    Urobilinogen, UA 0.2 E.U./dL 0.2 - 1.0 E.U./dL   Urinalysis, Microscopic Only - Urine, Clean Catch    Collection Time: 05/11/23 12:33 PM    Specimen: Urine, Clean Catch   Result Value Ref Range    RBC, UA None Seen None Seen, 0-2 /HPF    WBC, UA 0-2 None Seen, 0-2 /HPF    Bacteria, UA None Seen None Seen /HPF    Squamous Epithelial Cells, UA 3-6 (A) None Seen, 0-2 /HPF    Hyaline Casts, UA None Seen None Seen /LPF    Mucus, UA Moderate/2+ (A) None Seen, Trace /HPF    Methodology Manual Light Microscopy        Ordered the above labs and independently reviewed the results.        RADIOLOGY  US Gallbladder    Result Date: 5/11/2023  US GALLBLADDER-  INDICATIONS: Right upper quadrant pain  TECHNIQUE:  Ultrasound of the right upper quadrant  COMPARISON: None available  FINDINGS:  The gallbladder is nontender, with no stones demonstrated. No gallbladder wall thickening or pericholecystic fluid.  No intrahepatic or extrahepatic biliary ductal dilatation is demonstrated. The common biliary duct caliber is measured at 2 mm.  No liver lesion is identified. Diffusely, the echogenicity of the liver is otherwise in the range of normal relative to that of the right kidney.  The pancreas is partly obscured. The right kidney, 9.7 cm, and the pancreas otherwise appear unremarkable.          No evidence for acute cholecystitis. With persistent clinical indication, hepatobiliary scintigraphy and/or CT scan could be considered for further evaluation.    This report was finalized on 5/11/2023 3:44 PM by Dr. Harshal Brady M.D.        I ordered the above noted radiological studies. Reviewed by me and discussed with radiologist.  See dictation for official radiology interpretation.      MEDICATIONS GIVEN IN ER    Medications   morphine injection 4 mg (4 mg Intravenous Given 5/11/23 1233)   ondansetron (ZOFRAN) injection 4 mg (4 mg Intravenous Given 5/11/23 1233)   sodium chloride 0.9 % bolus 1,000 mL (0 mL Intravenous Stopped 5/11/23 1301)         ADDITIONAL ORDERS CONSIDERED BUT NOT ORDERED:  Nothing      PROGRESS, DATA ANALYSIS, CONSULTS, AND MEDICAL DECISION MAKING    All labs have been independently interpreted by myself.  All radiology studies have been independently interpreted by myself and discussed with radiologist dictating the report.   EKG's independently interpreted by myself.  Discussion below represents my analysis of pertinent findings related to patient's condition, differential diagnosis, treatment plan and final disposition.    I have discussed case with Dr. Mckinney, emergency room physician.  He has performed his own bedside examination and agrees with treatment plan.    ED Course as of 05/11/23 2043   Thu May  11, 2023 1216 Patient presents with 2-day history of right upper quadrant abdominal pain, nausea.  Differential diagnoses include but not limited to acute cholecystitis, symptomatic cholelithiasis, gastritis.    Plan to check basic labs, ultrasound.  She is hemodynamically stable and well-appearing. [EE]   1218 HCG Qualitative: Negative [EE]   1218 WBC: 5.74 [EE]   1236 Lipase: 20 [EE]   1236 ALT (SGPT): 9 [EE]   1236 AST (SGOT): 16 [EE]   1236 Total Bilirubin: 0.6 [EE]   1628 Gallbladder ultrasound films independently interpreted myself show no evidence of acute cholecystitis.  She has reassuring lab work.  We will have her follow-up with GI and general surgery.  She will be started on PPI, Carafate, Zofran.  Patient in agreement treatment plan. [EE]      ED Course User Index  [EE] Jesus Ferrera PA       AS OF 20:43 EDT VITALS:    BP - 99/73  HR - 81  TEMP - 97.4 °F (36.3 °C) (Tympanic)  O2 SATS - 99%        DIAGNOSIS  Final diagnoses:   Epigastric abdominal pain   Nausea         DISPOSITION  Discharged      Dictated utilizing Dragon dictation     Jesus Ferrera PA  05/11/23 2044

## 2023-05-11 NOTE — ED PROVIDER NOTES
Pt presents to the ED c/o  2 days of intermittent right upper quadrant pain, worse after eating.  Has had some nausea but no vomiting.  No significant diarrhea.  No fevers or chills.     On exam,   General: No acute distress, nontoxic  HEENT: Mucous membranes moist, atraumatic, EOMI  Neck: Full ROM  Pulm: Symmetric chest rise, nonlabored, lungs CTAB  Cardiovascular: Regular rate and rhythm, intact distal pulses  GI: Soft, nontender, nondistended, no rebound, no guarding, bowel sounds present  MSK: Full ROM, no deformity  Skin: Warm, dry  Neuro: Awake, alert, oriented x 4, GCS 15, moving all extremities, no focal deficits  Psych: Calm, cooperative        Plan:   ED Course as of 05/11/23 1754   Thu May 11, 2023   1216 Patient presents with 2-day history of right upper quadrant abdominal pain, nausea.  Differential diagnoses include but not limited to acute cholecystitis, symptomatic cholelithiasis, gastritis.    Plan to check basic labs, ultrasound.  She is hemodynamically stable and well-appearing. [EE]   1218 HCG Qualitative: Negative [EE]   1218 WBC: 5.74 [EE]   1236 Lipase: 20 [EE]   1236 ALT (SGPT): 9 [EE]   1236 AST (SGOT): 16 [EE]   1236 Total Bilirubin: 0.6 [EE]   1628 Gallbladder ultrasound films independently interpreted myself show no evidence of acute cholecystitis.  She has reassuring lab work.  We will have her follow-up with GI and general surgery.  She will be started on PPI, Carafate, Zofran.  Patient in agreement treatment plan. [EE]      ED Course User Index  [EE] Jesus Ferrera PA     Postprandial right upper quadrant pain, still some consideration for gallbladder dysfunction but at this point no evidence of any acute emergent process, safe for discharge with outpatient GI and surgical follow-up.  ED return for worsening symptoms as needed.  All questions and concerns addressed     Attestation:  The KAVITA and I have discussed this patient's history, physical exam, and treatment plan.  I have reviewed  the documentation and personally had a face to face interaction with the patient. I affirm the documentation and agree with the treatment and plan.  The attached note describes my personal findings.          Hayden Mckinney MD  05/11/23 5323

## 2023-05-22 ENCOUNTER — OFFICE VISIT (OUTPATIENT)
Dept: GASTROENTEROLOGY | Facility: CLINIC | Age: 23
End: 2023-05-22
Payer: COMMERCIAL

## 2023-05-22 ENCOUNTER — LAB (OUTPATIENT)
Dept: LAB | Facility: HOSPITAL | Age: 23
End: 2023-05-22
Payer: COMMERCIAL

## 2023-05-22 ENCOUNTER — PREP FOR SURGERY (OUTPATIENT)
Dept: SURGERY | Facility: SURGERY CENTER | Age: 23
End: 2023-05-22
Payer: COMMERCIAL

## 2023-05-22 VITALS
WEIGHT: 131.3 LBS | HEIGHT: 62 IN | SYSTOLIC BLOOD PRESSURE: 120 MMHG | TEMPERATURE: 97.1 F | HEART RATE: 84 BPM | OXYGEN SATURATION: 98 % | DIASTOLIC BLOOD PRESSURE: 80 MMHG | BODY MASS INDEX: 24.16 KG/M2

## 2023-05-22 DIAGNOSIS — K58.1 IRRITABLE BOWEL SYNDROME WITH CONSTIPATION: ICD-10-CM

## 2023-05-22 DIAGNOSIS — R10.13 EPIGASTRIC PAIN: ICD-10-CM

## 2023-05-22 DIAGNOSIS — R11.0 NAUSEA: ICD-10-CM

## 2023-05-22 DIAGNOSIS — R10.11 RIGHT UPPER QUADRANT ABDOMINAL PAIN: Primary | ICD-10-CM

## 2023-05-22 LAB — TSH SERPL DL<=0.05 MIU/L-ACNC: 2.07 UIU/ML (ref 0.27–4.2)

## 2023-05-22 PROCEDURE — 84443 ASSAY THYROID STIM HORMONE: CPT | Performed by: NURSE PRACTITIONER

## 2023-05-22 PROCEDURE — 36415 COLL VENOUS BLD VENIPUNCTURE: CPT | Performed by: NURSE PRACTITIONER

## 2023-05-22 RX ORDER — SODIUM CHLORIDE, SODIUM LACTATE, POTASSIUM CHLORIDE, CALCIUM CHLORIDE 600; 310; 30; 20 MG/100ML; MG/100ML; MG/100ML; MG/100ML
30 INJECTION, SOLUTION INTRAVENOUS CONTINUOUS PRN
OUTPATIENT
Start: 2023-05-22

## 2023-05-22 RX ORDER — SODIUM CHLORIDE 0.9 % (FLUSH) 0.9 %
3 SYRINGE (ML) INJECTION EVERY 12 HOURS SCHEDULED
OUTPATIENT
Start: 2023-05-22

## 2023-05-22 RX ORDER — PLECANATIDE 3 MG/1
3 TABLET ORAL DAILY
Qty: 30 TABLET | Refills: 5 | Status: SHIPPED | OUTPATIENT
Start: 2023-05-22

## 2023-05-22 RX ORDER — SODIUM CHLORIDE 0.9 % (FLUSH) 0.9 %
10 SYRINGE (ML) INJECTION AS NEEDED
OUTPATIENT
Start: 2023-05-22

## 2023-05-22 NOTE — PATIENT INSTRUCTIONS
Schedule EGD for further evaluation of symptoms.     Schedule HIDA scan for further evaluation of gallbladder.    Check lab work to check thyroid.    For irritable bowel syndrome with constipation, start Trulance as prescribed.  Prescription sent to pharmacy.

## 2023-05-22 NOTE — PROGRESS NOTES
"Chief Complaint   Patient presents with   • Abdominal Pain         History of Present Illness  Patient is a 22-year-old female who presents today for evaluation.  She had a recent ER visit for abdominal pain.  Right upper quadrant ultrasound was performed and was normal.  Lipase, LFTs, and CBC were normal.    Patient presents today for evaluation with concerns about abdominal pain.  Pain has been present for around 1 month and is worsening.  It is located to the right upper quadrant and epigastric area described as stabbing.  It comes and goes.  It is worse after eating and she feels nauseous after she eats.  She has noted particular that eating a large meal can make it worse.  At times it radiates to the back.    Over the last 3 months she has been experiencing issues with constipation.  She is generally having 1-2 bowel movements per week.  She tried MiraLAX with no improvement.  She has been taking Linzess however causes diarrhea she has difficulty taking this every day.  Denies any blood in the stool or dark stool.    Her mother and grandmother have irritable bowel syndrome.  Her mom and sister had their gallbladders removed.     Result Review :       CBC & Differential (05/11/2023 11:55)   Comprehensive Metabolic Panel (05/11/2023 11:55)   Lipase (05/11/2023 11:55)   US Gallbladder (05/11/2023 14:47)   ED with Hayden Mckinney MD (05/11/2023)       Vital Signs:   /80   Pulse 84   Temp 97.1 °F (36.2 °C)   Ht 157.5 cm (62.01\")   Wt 59.6 kg (131 lb 4.8 oz)   SpO2 98%   BMI 24.01 kg/m²     Body mass index is 24.01 kg/m².     Physical Exam  Vitals reviewed.   Constitutional:       General: She is not in acute distress.     Appearance: She is well-developed.   HENT:      Head: Normocephalic and atraumatic.   Pulmonary:      Effort: Pulmonary effort is normal. No respiratory distress.   Abdominal:      General: Abdomen is flat. Bowel sounds are normal. There is no distension.      Palpations: Abdomen is " soft.      Tenderness: There is no abdominal tenderness.   Skin:     General: Skin is dry.      Coloration: Skin is not pale.   Neurological:      Mental Status: She is alert and oriented to person, place, and time.   Psychiatric:         Thought Content: Thought content normal.           Assessment and Plan    Diagnoses and all orders for this visit:    1. Right upper quadrant abdominal pain (Primary)  -     NM HIDA SCAN WITH PHARMACOLOGICAL INTERVENTION; Future    2. Epigastric pain  -     NM HIDA SCAN WITH PHARMACOLOGICAL INTERVENTION; Future    3. Nausea  -     NM HIDA SCAN WITH PHARMACOLOGICAL INTERVENTION; Future    4. Irritable bowel syndrome with constipation  -     TSH Rfx On Abnormal To Free T4    Other orders  -     Plecanatide (Trulance) 3 MG tablet; Take 1 tablet by mouth Daily.  Dispense: 30 tablet; Refill: 5         Discussion  Patient presents today with concerns about epigastric and right upper quadrant pain associated with nausea.  Recommended EGD for further evaluation, assess for any evidence of peptic ulcer disease, gastritis, celiac disease, or H. pylori infection that could be contributing to symptoms.  Recommended HIDA scan for further evaluation of gallbladder.  Constipation felt to be secondary to irritable bowel syndrome.  Recommended trial of Trulance as Linzess has not been well-tolerated.  We will check thyroid to evaluate for hypothyroidism which could potentially be contributing to constipation.          Follow Up   Return for Follow up to review results after testing complete.    Patient Instructions   Schedule EGD for further evaluation of symptoms.     Schedule HIDA scan for further evaluation of gallbladder.    Check lab work to check thyroid.    For irritable bowel syndrome with constipation, start Trulance as prescribed.  Prescription sent to pharmacy.

## 2023-06-19 ENCOUNTER — ANESTHESIA EVENT (OUTPATIENT)
Dept: SURGERY | Facility: SURGERY CENTER | Age: 23
End: 2023-06-19
Payer: COMMERCIAL

## 2023-06-19 ENCOUNTER — ANESTHESIA (OUTPATIENT)
Dept: SURGERY | Facility: SURGERY CENTER | Age: 23
End: 2023-06-19
Payer: COMMERCIAL

## 2023-06-19 PROCEDURE — 25010000002 PROPOFOL 1000 MG/100ML EMULSION: Performed by: ANESTHESIOLOGY

## 2023-06-19 PROCEDURE — 25010000002 PROPOFOL 10 MG/ML EMULSION: Performed by: ANESTHESIOLOGY

## 2023-06-19 RX ORDER — LIDOCAINE HYDROCHLORIDE 20 MG/ML
INJECTION, SOLUTION INFILTRATION; PERINEURAL AS NEEDED
Status: DISCONTINUED | OUTPATIENT
Start: 2023-06-19 | End: 2023-06-19 | Stop reason: SURG

## 2023-06-19 RX ORDER — PROPOFOL 10 MG/ML
INJECTION, EMULSION INTRAVENOUS AS NEEDED
Status: DISCONTINUED | OUTPATIENT
Start: 2023-06-19 | End: 2023-06-19 | Stop reason: SURG

## 2023-06-19 RX ADMIN — LIDOCAINE HYDROCHLORIDE 40 MG: 20 INJECTION, SOLUTION INFILTRATION; PERINEURAL at 13:52

## 2023-06-19 RX ADMIN — PROPOFOL 200 MCG/KG/MIN: 10 INJECTION, EMULSION INTRAVENOUS at 13:52

## 2023-06-19 RX ADMIN — PROPOFOL 140 MG: 10 INJECTION, EMULSION INTRAVENOUS at 13:52

## 2023-06-19 NOTE — ANESTHESIA POSTPROCEDURE EVALUATION
"Patient: Mayo Dietrich    Procedure Summary       Date: 06/19/23 Room / Location: [facility]    Anesthesia Start: 1345 Anesthesia Stop: 1404    Procedure: ESOPHAGOGASTRODUODENOSCOPY WITH BIOPSY Diagnosis:       Right upper quadrant abdominal pain      Epigastric pain      Nausea      (Right upper quadrant abdominal pain [R10.11])      (Epigastric pain [R10.13])      (Nausea [R11.0])    Surgeons: Bill Leach MD Provider: Butch Jules DO    Anesthesia Type: MAC ASA Status: 2            Anesthesia Type: MAC    Vitals  Vitals Value Taken Time   /68 06/19/23 1413   Temp 37.1 °C (98.7 °F) 06/19/23 1403   Pulse 72 06/19/23 1413   Resp 16 06/19/23 1413   SpO2 99 % 06/19/23 1413           Post Anesthesia Care and Evaluation    Patient location during evaluation: bedside  Patient participation: complete - patient participated  Level of consciousness: awake and alert  Pain management: adequate    Airway patency: patent  Anesthetic complications: No anesthetic complications  PONV Status: controlled  Cardiovascular status: acceptable and hemodynamically stable  Respiratory status: acceptable, spontaneous ventilation and nonlabored ventilation  Hydration status: acceptable    Comments: /68 (BP Location: Left arm, Patient Position: Lying)   Pulse 72   Temp 37.1 °C (98.7 °F) (Temporal)   Resp 16   Ht 157.5 cm (62\")   Wt 58.9 kg (129 lb 12.8 oz)   LMP 06/15/2023 (Exact Date)   SpO2 99%   BMI 23.74 kg/m²       "

## 2023-06-19 NOTE — ANESTHESIA PREPROCEDURE EVALUATION
Anesthesia Evaluation     Patient summary reviewed   no history of anesthetic complications:   NPO Solid Status: > 8 hours  NPO Liquid Status: > 2 hours           Airway   Mallampati: II  TM distance: >3 FB  Neck ROM: full  No difficulty expected  Dental - normal exam     Pulmonary     breath sounds clear to auscultation  (+) a smoker Former,  (-) shortness of breath, recent URI  Cardiovascular   Exercise tolerance: good (4-7 METS)    Rhythm: regular  Rate: normal    (+) hypertension  (-) past MI, dysrhythmias, angina    ROS comment: 2022 TTE - Interpretation Summary  ·  Left ventricular systolic function is normal. Calculated left ventricular EF = 62.7% Normal left ventricular cavity size and wall thickness noted. All left ventricular wall segments contract normally. Left ventricular diastolic function was normal.  ·  Normal echocardiogram.      Neuro/Psych  (+) headaches (migraine hx), psychiatric history Anxiety and Depression  (-) seizures, CVA  GI/Hepatic/Renal/Endo    (+) GERD  (-)  obesity, no renal disease, diabetes    Musculoskeletal     (-) neck stiffness  Abdominal    Substance History      OB/GYN    (-)  Pregnant        Other                        Anesthesia Plan    ASA 2     MAC     (MAC anesthesia discussed with patient and/or patient representative. Risks (including but not limited to intra-op awareness), benefits, and alternatives were discussed. Understanding was voiced with an agreement to proceed with a MAC technique and General as a backup option. )    Anesthetic plan, risks, benefits, and alternatives have been provided, discussed and informed consent has been obtained with: patient.      CODE STATUS:

## 2024-05-07 DIAGNOSIS — K20.90 ESOPHAGITIS: ICD-10-CM

## 2024-05-07 RX ORDER — OMEPRAZOLE 40 MG/1
40 CAPSULE, DELAYED RELEASE ORAL DAILY
Qty: 90 CAPSULE | Refills: 0 | Status: SHIPPED | OUTPATIENT
Start: 2024-05-07

## 2024-09-09 ENCOUNTER — OFFICE VISIT (OUTPATIENT)
Dept: FAMILY MEDICINE CLINIC | Facility: CLINIC | Age: 24
End: 2024-09-09
Payer: COMMERCIAL

## 2024-09-09 VITALS
HEIGHT: 62 IN | HEART RATE: 114 BPM | OXYGEN SATURATION: 100 % | SYSTOLIC BLOOD PRESSURE: 114 MMHG | TEMPERATURE: 97.3 F | DIASTOLIC BLOOD PRESSURE: 78 MMHG | BODY MASS INDEX: 23.55 KG/M2 | WEIGHT: 128 LBS

## 2024-09-09 DIAGNOSIS — F98.8 ATTENTION DEFICIT DISORDER (ADD) WITHOUT HYPERACTIVITY: ICD-10-CM

## 2024-09-09 DIAGNOSIS — Z13.6 SCREENING FOR CARDIOVASCULAR CONDITION: ICD-10-CM

## 2024-09-09 DIAGNOSIS — Z00.00 HEALTHCARE MAINTENANCE: Primary | ICD-10-CM

## 2024-09-09 DIAGNOSIS — D22.9 CHANGE IN NEVUS: ICD-10-CM

## 2024-09-09 PROCEDURE — 99395 PREV VISIT EST AGE 18-39: CPT | Performed by: FAMILY MEDICINE

## 2024-09-09 RX ORDER — DEXTROAMPHETAMINE SACCHARATE, AMPHETAMINE ASPARTATE MONOHYDRATE, DEXTROAMPHETAMINE SULFATE AND AMPHETAMINE SULFATE 2.5; 2.5; 2.5; 2.5 MG/1; MG/1; MG/1; MG/1
10 CAPSULE, EXTENDED RELEASE ORAL EVERY MORNING
Qty: 30 CAPSULE | Refills: 0 | Status: SHIPPED | OUTPATIENT
Start: 2024-09-09

## 2024-09-09 RX ORDER — MEDROXYPROGESTERONE ACETATE 150 MG/ML
150 INJECTION, SUSPENSION INTRAMUSCULAR EVERY 24 HOURS
COMMUNITY

## 2024-09-09 NOTE — PROGRESS NOTES
Mayo Dietrich is here today for an annual physical exam.     Pt wanting ADD testing. Having trouble focusing since childhood. Daydreaming and talking in class. Trouble with concentration and losing things.     Having moles she wants me to look at. One on her ear has grown. One under her right arm scabs.     Eating a healthy diet. Exercising routinely.   Regular periods-spotting with depo. Wears seat belt. Feels safe at home.   Sexual activity: yes  Birth control: depo  Pregnancy:      PHQ-2 Depression Screening  Little interest or pleasure in doing things? 0-->not at all   Feeling down, depressed, or hopeless? 0-->not at all   PHQ-2 Total Score 0         I have reviewed the patient's medical, family, and social history in detail and updated the computerized patient record.    Screening history:  Colonoscopy - not yet due  Mammogram- not yet due, Pap/pelvic - utd  Metabolic - due    Health Maintenance   Topic Date Due    CHLAMYDIA SCREENING  2020    ANNUAL PHYSICAL  2020    COVID-19 Vaccine (2023- season) 2024    INFLUENZA VACCINE  2024    PAP SMEAR  2027    TDAP/TD VACCINES (3 - Td or Tdap) 2032    HEPATITIS C SCREENING  Completed    Pneumococcal Vaccine 0-64  Aged Out    MENINGOCOCCAL VACCINE  Aged Out    HPV VACCINES  Discontinued       Review of Systems   Constitutional:  Negative for fever.   HENT:  Negative for hearing loss.    Eyes:  Negative for visual disturbance.   Respiratory:  Negative for shortness of breath.    Cardiovascular:  Negative for chest pain.   Gastrointestinal:  Negative for constipation and diarrhea.   Genitourinary:  Negative for difficulty urinating.   Musculoskeletal:  Negative for arthralgias and myalgias.   Skin:  Negative for rash.   Hematological:  Does not bruise/bleed easily.   Psychiatric/Behavioral:  Negative for dysphoric mood.        /78 (BP Location: Left arm, Patient Position: Sitting, Cuff Size: Adult)   Pulse 114   Temp  "97.3 °F (36.3 °C)   Ht 157.5 cm (62\")   Wt 58.1 kg (128 lb)   SpO2 100%   BMI 23.41 kg/m²      Physical Exam    Vital signs reviewed.  General appearance: No acute distress  Eyes: conjunctiva clear without erythema; pupils equally round and reactive  ENT: external ears and nose normal; hearing normal, oropharynx clear  Neck: supple; no thyromegaly  CV: normal rate and rhythm; no peripheral edema  Respiratory: normal respiratory effort; lungs clear to auscultation bilaterally  MSK: normal gait and station; no focal joint deformity or swelling  Skin: no rash or wounds; normal turgor  Neuro: cranial nerves 2-12 grossly intact; normal sensation to light touch  Psych: mood and affect normal; recent and remote memory intact    No visits with results within 2 Week(s) from this visit.   Latest known visit with results is:   Admission on 06/19/2023, Discharged on 06/19/2023   Component Date Value Ref Range Status    HCG, Urine, QL 06/19/2023 Negative  Negative Final    Lot Number 06/19/2023 [id number]   Final    Internal Positive Control 06/19/2023 Passed  Positive, Passed Final    Internal Negative Control 06/19/2023 Passed  Negative, Passed Final    Expiration Date 06/19/2023 2024-05-31   Final    Case Report 06/19/2023    Final                    Value:Surgical Pathology Report                         Case: [id number]                                  Authorizing Provider:  Bill Leach MD    Collected:           06/19/2023 01:56 PM          Ordering Location:     [facility] SURGERY     Received:            06/19/2023 03:36 PM                                 [facility] MAIN OR                                                     Pathologist:           Hugo Blevins MD                                                         Specimens:   1) - Small Intestine, small bowel biopsy - r/o celiac                                               2) - Stomach, random gastric biopsy                                "                                  3) - Esophagus, g e junction biopsy - esophagitis                                          Clinical Information 06/19/2023    Final                    Value:This result contains rich text formatting which cannot be displayed here.    Final Diagnosis 06/19/2023    Final                    Value:This result contains rich text formatting which cannot be displayed here.    Gross Description 06/19/2023    Final                    Value:This result contains rich text formatting which cannot be displayed here.         Current Outpatient Medications:     medroxyPROGESTERone (DEPO-PROVERA) 150 MG/ML injection, Inject 1 mL into the appropriate muscle as directed by prescriber Daily., Disp: , Rfl:     ondansetron ODT (ZOFRAN-ODT) 4 MG disintegrating tablet, Place 1 tablet on the tongue 4 (Four) Times a Day As Needed for Nausea or Vomiting., Disp: 15 tablet, Rfl: 0    ubrogepant (UBRELVY) 100 MG tablet, Take 1 tablet by mouth., Disp: , Rfl:     amphetamine-dextroamphetamine XR (Adderall XR) 10 MG 24 hr capsule, Take 1 capsule by mouth Every Morning, Disp: 30 capsule, Rfl: 0    Diagnoses and all orders for this visit:    1. Healthcare maintenance (Primary)    2. Screening for cardiovascular condition  -     Comprehensive Metabolic Panel  -     Lipid Panel    3. Change in nevus  -     Ambulatory Referral to Dermatology    4. Attention deficit disorder (ADD) without hyperactivity  -     amphetamine-dextroamphetamine XR (Adderall XR) 10 MG 24 hr capsule; Take 1 capsule by mouth Every Morning  Dispense: 30 capsule; Refill: 0        Encourage healthy diet and exercise.  Encourage patient to stay up to date on screening examinations as indicated based on age and risk factors. F/U yearly.     Advised to go to [facility] for testing. Will give temporary diagnosis and treatment until she can get in for official diagnosis and testing. Utox and contract at her next appt if the trial of this  medication works for her. Mayo.

## 2024-09-10 LAB
ALBUMIN SERPL-MCNC: 5.2 G/DL (ref 3.5–5.2)
ALBUMIN/GLOB SERPL: 1.9 G/DL
ALP SERPL-CCNC: 101 U/L (ref 39–117)
ALT SERPL-CCNC: 12 U/L (ref 1–33)
AST SERPL-CCNC: 16 U/L (ref 1–32)
BILIRUB SERPL-MCNC: 0.3 MG/DL (ref 0–1.2)
BUN SERPL-MCNC: 6 MG/DL (ref 6–20)
BUN/CREAT SERPL: 10 (ref 7–25)
CALCIUM SERPL-MCNC: 10 MG/DL (ref 8.6–10.5)
CHLORIDE SERPL-SCNC: 106 MMOL/L (ref 98–107)
CHOLEST SERPL-MCNC: 156 MG/DL (ref 0–200)
CO2 SERPL-SCNC: 25.5 MMOL/L (ref 22–29)
CREAT SERPL-MCNC: 0.6 MG/DL (ref 0.57–1)
EGFRCR SERPLBLD CKD-EPI 2021: 130.3 ML/MIN/1.73
GLOBULIN SER CALC-MCNC: 2.8 GM/DL
GLUCOSE SERPL-MCNC: 95 MG/DL (ref 65–99)
HDLC SERPL-MCNC: 55 MG/DL (ref 40–60)
LDLC SERPL CALC-MCNC: 86 MG/DL (ref 0–100)
POTASSIUM SERPL-SCNC: 3.8 MMOL/L (ref 3.5–5.2)
PROT SERPL-MCNC: 8 G/DL (ref 6–8.5)
SODIUM SERPL-SCNC: 141 MMOL/L (ref 136–145)
TRIGL SERPL-MCNC: 77 MG/DL (ref 0–150)
VLDLC SERPL CALC-MCNC: 15 MG/DL (ref 5–40)

## 2024-10-06 DIAGNOSIS — F98.8 ATTENTION DEFICIT DISORDER (ADD) WITHOUT HYPERACTIVITY: ICD-10-CM

## 2024-10-07 RX ORDER — DEXTROAMPHETAMINE SACCHARATE, AMPHETAMINE ASPARTATE MONOHYDRATE, DEXTROAMPHETAMINE SULFATE AND AMPHETAMINE SULFATE 2.5; 2.5; 2.5; 2.5 MG/1; MG/1; MG/1; MG/1
10 CAPSULE, EXTENDED RELEASE ORAL EVERY MORNING
Qty: 30 CAPSULE | Refills: 0 | Status: SHIPPED | OUTPATIENT
Start: 2024-10-07 | End: 2024-10-11

## 2024-10-07 NOTE — TELEPHONE ENCOUNTER
LOV 9/9/24  NOV 9/15/25  LF 9/9/24    Protocol  Please advise    [facility]

## 2024-10-11 ENCOUNTER — TELEPHONE (OUTPATIENT)
Dept: FAMILY MEDICINE CLINIC | Facility: CLINIC | Age: 24
End: 2024-10-11
Payer: COMMERCIAL

## 2024-10-11 DIAGNOSIS — F98.8 ATTENTION DEFICIT DISORDER (ADD) WITHOUT HYPERACTIVITY: ICD-10-CM

## 2024-10-11 RX ORDER — DEXTROAMPHETAMINE SACCHARATE, AMPHETAMINE ASPARTATE MONOHYDRATE, DEXTROAMPHETAMINE SULFATE AND AMPHETAMINE SULFATE 1.25; 1.25; 1.25; 1.25 MG/1; MG/1; MG/1; MG/1
10 CAPSULE, EXTENDED RELEASE ORAL EVERY MORNING
Qty: 60 CAPSULE | Refills: 0 | Status: SHIPPED | OUTPATIENT
Start: 2024-10-11

## 2024-10-11 NOTE — TELEPHONE ENCOUNTER
Pt called stating Adderal xr 1mg is on back order.  Pt's pharmacy has adderall xr 5mg.  Can you resend rx as two tabs po daily?  [facility] RMA

## 2024-10-17 ENCOUNTER — PRIOR AUTHORIZATION (OUTPATIENT)
Dept: FAMILY MEDICINE CLINIC | Facility: CLINIC | Age: 24
End: 2024-10-17
Payer: COMMERCIAL

## 2024-10-17 NOTE — TELEPHONE ENCOUNTER
(Key: [id number])Amphetamine-dextroamphetamine 5 mg    10 mg is back ordered    Approved for 1 year 10/17/2025

## 2024-11-05 DIAGNOSIS — F98.8 ATTENTION DEFICIT DISORDER (ADD) WITHOUT HYPERACTIVITY: ICD-10-CM

## 2024-11-06 RX ORDER — DEXTROAMPHETAMINE SACCHARATE, AMPHETAMINE ASPARTATE MONOHYDRATE, DEXTROAMPHETAMINE SULFATE AND AMPHETAMINE SULFATE 1.25; 1.25; 1.25; 1.25 MG/1; MG/1; MG/1; MG/1
10 CAPSULE, EXTENDED RELEASE ORAL EVERY MORNING
Qty: 60 CAPSULE | Refills: 0 | Status: SHIPPED | OUTPATIENT
Start: 2024-11-06

## 2024-11-13 ENCOUNTER — TELEPHONE (OUTPATIENT)
Dept: FAMILY MEDICINE CLINIC | Facility: CLINIC | Age: 24
End: 2024-11-13
Payer: COMMERCIAL

## 2024-11-13 DIAGNOSIS — F98.8 ATTENTION DEFICIT DISORDER (ADD) WITHOUT HYPERACTIVITY: ICD-10-CM

## 2024-11-13 RX ORDER — DEXTROAMPHETAMINE SACCHARATE, AMPHETAMINE ASPARTATE MONOHYDRATE, DEXTROAMPHETAMINE SULFATE AND AMPHETAMINE SULFATE 1.25; 1.25; 1.25; 1.25 MG/1; MG/1; MG/1; MG/1
10 CAPSULE, EXTENDED RELEASE ORAL EVERY MORNING
Qty: 60 CAPSULE | Refills: 0 | Status: SHIPPED | OUTPATIENT
Start: 2024-11-13

## 2024-11-13 NOTE — TELEPHONE ENCOUNTER
Caller: Mayo Dietrich    Relationship: Self    Best call back number: [phone]    What was the call regarding: PATIENT STATES THAT DR. OSEI WANTED HER TO SCHEDULE APPOINTMENT TO START ADHD REGIMENT. PATIENT ONLY HAS EIGHT PILLS LEFT OF THE MEDICATION SHE WAS PRESCRIBED FROM DR. OSEI. PATIENT WAS SCHEDULED FOR SOONEST APPOINTMENT AND WAS ADDED ON WAIT LIST. PATIENT WOULD LIKE TO BE SEEN SOONER THAN JANUARY 31.

## 2024-12-02 ENCOUNTER — OFFICE VISIT (OUTPATIENT)
Dept: FAMILY MEDICINE CLINIC | Facility: CLINIC | Age: 24
End: 2024-12-02
Payer: COMMERCIAL

## 2024-12-02 VITALS
HEIGHT: 62 IN | SYSTOLIC BLOOD PRESSURE: 110 MMHG | BODY MASS INDEX: 21.83 KG/M2 | HEART RATE: 71 BPM | TEMPERATURE: 97.6 F | OXYGEN SATURATION: 98 % | WEIGHT: 118.6 LBS | DIASTOLIC BLOOD PRESSURE: 74 MMHG

## 2024-12-02 DIAGNOSIS — F98.8 ATTENTION DEFICIT DISORDER (ADD) WITHOUT HYPERACTIVITY: ICD-10-CM

## 2024-12-02 DIAGNOSIS — Z79.899 HIGH RISK MEDICATION USE: Primary | ICD-10-CM

## 2024-12-02 PROCEDURE — 99213 OFFICE O/P EST LOW 20 MIN: CPT | Performed by: FAMILY MEDICINE

## 2024-12-02 RX ORDER — DEXTROAMPHETAMINE SACCHARATE, AMPHETAMINE ASPARTATE MONOHYDRATE, DEXTROAMPHETAMINE SULFATE AND AMPHETAMINE SULFATE 5; 5; 5; 5 MG/1; MG/1; MG/1; MG/1
20 CAPSULE, EXTENDED RELEASE ORAL EVERY MORNING
Qty: 90 CAPSULE | Refills: 0 | Status: SHIPPED | OUTPATIENT
Start: 2024-12-02

## 2024-12-02 RX ORDER — OMEPRAZOLE 40 MG/1
1 CAPSULE, DELAYED RELEASE ORAL DAILY
COMMUNITY
Start: 2024-11-12

## 2024-12-02 NOTE — PROGRESS NOTES
Subjective   Mayo Dietrich is a 24 y.o. female.     Chief Complaint   Patient presents with    Med Refill     ADD-we started medication 3 months ago and she is here for a follow-up.  She has gotten into [facility] for the official diagnosis-records reviewed.  The medication we started on a trial basis. It has really helped her focus. She feels much more calm in her body. Feels a higher dose would help more. Is planning on getting into therapy soon. Has been losing weight but gaining it back.           The following portions of the patient's history were reviewed and updated as appropriate: allergies, current medications, past family history, past medical history, past social history, past surgical history and problem list.    Past Medical History:   Diagnosis Date    Allergic     Anxiety     Depression     GERD (gastroesophageal reflux disease)     Headache     Urinary tract infection        Past Surgical History:   Procedure Laterality Date    ENDOSCOPY N/A 6/19/2023    Procedure: ESOPHAGOGASTRODUODENOSCOPY WITH BIOPSY;  Surgeon: Bill Leach MD;  [place] [facility] [place];  Service: Gastroenterology;  Laterality: N/A;  esophagitis    FOOT SURGERY      NASAL SEPTUM SURGERY      SINUS SURGERY      WISDOM TOOTH EXTRACTION         Family History   Problem Relation Age of Onset    Irritable bowel syndrome Mother     Hypertension Mother     Other Mother         hemachromatosis    Stroke Father     Irritable bowel syndrome Maternal Grandmother     Colon cancer Neg Hx     Colon polyps Neg Hx     Crohn's disease Neg Hx     Ulcerative colitis Neg Hx        Social History     Socioeconomic History    Marital status: Single   Tobacco Use    Smoking status: Former     Types: Electronic Cigarette     Start date: 8/1/2020     Quit date: 7/1/2021     Years since quitting: 3.4    Smokeless tobacco: Never   Vaping Use    Vaping status: Every Day    Substances: Nicotine   Substance and Sexual Activity    Alcohol  "use: Never    Drug use: Never    Sexual activity: Yes     Partners: Male     Birth control/protection: Condom, Pill       Review of Systems   Cardiovascular:  Negative for palpitations.   Psychiatric/Behavioral:  Negative for sleep disturbance.        Objective   Visit Vitals  /74 (BP Location: Left arm, Patient Position: Sitting, Cuff Size: Adult)   Pulse 71   Temp 97.6 °F (36.4 °C)   Ht 157.5 cm (62\")   Wt 53.8 kg (118 lb 9.6 oz)   SpO2 98%   BMI 21.69 kg/m²     Body mass index is 21.69 kg/m².  Physical Exam  Constitutional:       Appearance: Normal appearance. She is well-developed.   Cardiovascular:      Rate and Rhythm: Normal rate and regular rhythm.      Heart sounds: Normal heart sounds.   Pulmonary:      Effort: Pulmonary effort is normal.      Breath sounds: Normal breath sounds.   Musculoskeletal:         General: No swelling. Normal range of motion.   Skin:     General: Skin is warm and dry.      Findings: No rash.   Neurological:      General: No focal deficit present.      Mental Status: She is alert and oriented to person, place, and time.   Psychiatric:         Mood and Affect: Mood normal.         Behavior: Behavior normal.           Assessment & Plan   Diagnoses and all orders for this visit:    1. High risk medication use (Primary)  -     Compliance Drug Analysis, Ur - Urine, Clean Catch    2. Attention deficit disorder (ADD) without hyperactivity  -     amphetamine-dextroamphetamine XR (Adderall XR) 20 MG 24 hr capsule; Take 1 capsule by mouth Every Morning  Dispense: 90 capsule; Refill: 0                 Gael, f/u in 3 months, increased meds.   "

## 2024-12-04 DIAGNOSIS — K20.90 ESOPHAGITIS, UNSPECIFIED WITHOUT BLEEDING: ICD-10-CM

## 2024-12-10 LAB — DRUGS UR: NORMAL

## 2025-02-26 DIAGNOSIS — F98.8 ATTENTION DEFICIT DISORDER (ADD) WITHOUT HYPERACTIVITY: ICD-10-CM

## 2025-02-26 RX ORDER — DEXTROAMPHETAMINE SACCHARATE, AMPHETAMINE ASPARTATE MONOHYDRATE, DEXTROAMPHETAMINE SULFATE AND AMPHETAMINE SULFATE 5; 5; 5; 5 MG/1; MG/1; MG/1; MG/1
20 CAPSULE, EXTENDED RELEASE ORAL EVERY MORNING
Qty: 30 CAPSULE | Refills: 0 | Status: SHIPPED | OUTPATIENT
Start: 2025-02-26

## 2025-03-25 DIAGNOSIS — F98.8 ATTENTION DEFICIT DISORDER (ADD) WITHOUT HYPERACTIVITY: ICD-10-CM

## 2025-03-26 RX ORDER — DEXTROAMPHETAMINE SACCHARATE, AMPHETAMINE ASPARTATE MONOHYDRATE, DEXTROAMPHETAMINE SULFATE AND AMPHETAMINE SULFATE 5; 5; 5; 5 MG/1; MG/1; MG/1; MG/1
20 CAPSULE, EXTENDED RELEASE ORAL EVERY MORNING
Qty: 30 CAPSULE | Refills: 0 | Status: SHIPPED | OUTPATIENT
Start: 2025-03-26

## 2025-04-14 ENCOUNTER — OFFICE VISIT (OUTPATIENT)
Dept: FAMILY MEDICINE CLINIC | Facility: CLINIC | Age: 25
End: 2025-04-14
Payer: COMMERCIAL

## 2025-04-14 VITALS
BODY MASS INDEX: 21.57 KG/M2 | HEIGHT: 62 IN | SYSTOLIC BLOOD PRESSURE: 104 MMHG | OXYGEN SATURATION: 100 % | TEMPERATURE: 99 F | WEIGHT: 117.2 LBS | DIASTOLIC BLOOD PRESSURE: 74 MMHG | HEART RATE: 103 BPM

## 2025-04-14 DIAGNOSIS — F51.01 PRIMARY INSOMNIA: ICD-10-CM

## 2025-04-14 DIAGNOSIS — F98.8 ATTENTION DEFICIT DISORDER (ADD) WITHOUT HYPERACTIVITY: Primary | ICD-10-CM

## 2025-04-14 PROCEDURE — 99214 OFFICE O/P EST MOD 30 MIN: CPT | Performed by: FAMILY MEDICINE

## 2025-04-14 RX ORDER — DEXTROAMPHETAMINE SACCHARATE, AMPHETAMINE ASPARTATE MONOHYDRATE, DEXTROAMPHETAMINE SULFATE AND AMPHETAMINE SULFATE 5; 5; 5; 5 MG/1; MG/1; MG/1; MG/1
20 CAPSULE, EXTENDED RELEASE ORAL EVERY MORNING
Qty: 30 CAPSULE | Refills: 0 | Status: SHIPPED | OUTPATIENT
Start: 2025-04-14

## 2025-04-14 RX ORDER — TRAZODONE HYDROCHLORIDE 50 MG/1
50-100 TABLET ORAL NIGHTLY
Qty: 180 TABLET | Refills: 1 | Status: SHIPPED | OUTPATIENT
Start: 2025-04-14

## 2025-04-14 NOTE — PROGRESS NOTES
Subjective   Mayo Dietrich is a 24 y.o. female.     Chief Complaint   Patient presents with    3 month follow up     Has been having trouble sleeping for about 2 months.       ADD-we started medication 6 months ago and she is here for a follow-up.  She has gotten into [facility] for the official diagnosis-records reviewed previously.  Since starting medication it has really helped her focus. She feels much more calm in her body. Has really helped her.     Having trouble sleeping for one month intermittently. Trouble falling asleep and staying asleep. She has had an increase in stress recently.       The following portions of the patient's history were reviewed and updated as appropriate: allergies, current medications, past family history, past medical history, past social history, past surgical history and problem list.    Past Medical History:   Diagnosis Date    Allergic     Anxiety     Depression     GERD (gastroesophageal reflux disease)     Headache     Urinary tract infection        Past Surgical History:   Procedure Laterality Date    ENDOSCOPY N/A 6/19/2023    Procedure: ESOPHAGOGASTRODUODENOSCOPY WITH BIOPSY;  Surgeon: Bill Leach MD;  Location: [facility] [place];  Service: Gastroenterology;  Laterality: N/A;  esophagitis    FOOT SURGERY      NASAL SEPTUM SURGERY      SINUS SURGERY      WISDOM TOOTH EXTRACTION         Family History   Problem Relation Age of Onset    Irritable bowel syndrome Mother     Hypertension Mother     Other Mother         hemachromatosis    Stroke Father     Irritable bowel syndrome Maternal Grandmother     Colon cancer Neg Hx     Colon polyps Neg Hx     Crohn's disease Neg Hx     Ulcerative colitis Neg Hx        Social History     Socioeconomic History    Marital status: Single   Tobacco Use    Smoking status: Former     Types: Electronic Cigarette     Start date: 8/1/2020     Quit date: 7/1/2021     Years since quitting: 3.7    Smokeless tobacco: Never  "  Vaping Use    Vaping status: Every Day    Substances: Nicotine   Substance and Sexual Activity    Alcohol use: Never    Drug use: Never    Sexual activity: Yes     Partners: Male     Birth control/protection: Condom, Pill       Review of Systems   Constitutional:  Negative for chills, diaphoresis, fatigue and fever.   HENT:  Negative for congestion, sore throat and swollen glands.    Respiratory:  Negative for cough.    Cardiovascular:  Negative for chest pain.   Gastrointestinal:  Negative for abdominal pain, nausea and vomiting.   Genitourinary:  Negative for dysuria.   Musculoskeletal:  Negative for myalgias and neck pain.   Skin:  Negative for rash.   Neurological:  Negative for weakness and numbness.       Objective   Visit Vitals  /74 (BP Location: Left arm, Patient Position: Sitting, Cuff Size: Adult)   Pulse 103   Temp 99 °F (37.2 °C)   Ht 157.5 cm (62.01\")   Wt 53.2 kg (117 lb 3.2 oz)   SpO2 100%   BMI 21.43 kg/m²     Body mass index is 21.43 kg/m².  Physical Exam  Constitutional:       Appearance: Normal appearance. She is well-developed.   Cardiovascular:      Rate and Rhythm: Normal rate and regular rhythm.      Heart sounds: Normal heart sounds.   Pulmonary:      Effort: Pulmonary effort is normal.      Breath sounds: Normal breath sounds.   Musculoskeletal:         General: No swelling. Normal range of motion.   Skin:     General: Skin is warm and dry.      Findings: No rash.   Neurological:      General: No focal deficit present.      Mental Status: She is alert and oriented to person, place, and time.   Psychiatric:         Mood and Affect: Mood normal.         Behavior: Behavior normal.           Assessment & Plan   Diagnoses and all orders for this visit:    1. Attention deficit disorder (ADD) without hyperactivity (Primary)  -     amphetamine-dextroamphetamine XR (ADDERALL XR) 20 MG 24 hr capsule; Take 1 capsule by mouth Every Morning  Dispense: 30 capsule; Refill: 0    2. Primary " insomnia  -     traZODone (DESYREL) 50 MG tablet; Take 1-2 tablets by mouth Every Night.  Dispense: 180 tablet; Refill: 1                 Gael, cont meds, f/u in 3 months. Utox utd. Added in trazodone and discussed risks and benefits.

## 2025-05-06 DIAGNOSIS — F98.8 ATTENTION DEFICIT DISORDER (ADD) WITHOUT HYPERACTIVITY: ICD-10-CM

## 2025-05-07 RX ORDER — DEXTROAMPHETAMINE SACCHARATE, AMPHETAMINE ASPARTATE MONOHYDRATE, DEXTROAMPHETAMINE SULFATE AND AMPHETAMINE SULFATE 5; 5; 5; 5 MG/1; MG/1; MG/1; MG/1
20 CAPSULE, EXTENDED RELEASE ORAL EVERY MORNING
Qty: 30 CAPSULE | Refills: 0 | Status: SHIPPED | OUTPATIENT
Start: 2025-05-07

## 2025-06-10 DIAGNOSIS — F98.8 ATTENTION DEFICIT DISORDER (ADD) WITHOUT HYPERACTIVITY: ICD-10-CM

## 2025-06-11 RX ORDER — DEXTROAMPHETAMINE SACCHARATE, AMPHETAMINE ASPARTATE MONOHYDRATE, DEXTROAMPHETAMINE SULFATE AND AMPHETAMINE SULFATE 5; 5; 5; 5 MG/1; MG/1; MG/1; MG/1
20 CAPSULE, EXTENDED RELEASE ORAL EVERY MORNING
Qty: 30 CAPSULE | Refills: 0 | Status: SHIPPED | OUTPATIENT
Start: 2025-06-11

## 2025-07-07 DIAGNOSIS — F98.8 ATTENTION DEFICIT DISORDER (ADD) WITHOUT HYPERACTIVITY: ICD-10-CM

## 2025-07-07 RX ORDER — DEXTROAMPHETAMINE SACCHARATE, AMPHETAMINE ASPARTATE MONOHYDRATE, DEXTROAMPHETAMINE SULFATE AND AMPHETAMINE SULFATE 5; 5; 5; 5 MG/1; MG/1; MG/1; MG/1
20 CAPSULE, EXTENDED RELEASE ORAL EVERY MORNING
Qty: 30 CAPSULE | Refills: 0 | Status: SHIPPED | OUTPATIENT
Start: 2025-07-07 | End: 2025-07-14 | Stop reason: SDUPTHER

## 2025-07-07 NOTE — TELEPHONE ENCOUNTER
Rx Refill Note  Requested Prescriptions     Pending Prescriptions Disp Refills    amphetamine-dextroamphetamine XR (ADDERALL XR) 20 MG 24 hr capsule [Pharmacy Med Name: dextroamphetamine-amphetamine ER 20 mg 24hr capsule,extend release] 30 capsule 0     Sig: TAKE ONE CAPSULE BY MOUTH EVERY MORNING      Last office visit with prescribing clinician: 4/14/2025   Last telemedicine visit with prescribing clinician: Visit date not found   Next office visit with prescribing clinician: Visit date not found                         Would you like a call back once the refill request has been completed: [] Yes [] No    If the office needs to give you a call back, can they leave a voicemail: [] Yes [] No    Alicia Sr MA  07/07/25, 13:38 EDT

## 2025-07-14 ENCOUNTER — OFFICE VISIT (OUTPATIENT)
Dept: FAMILY MEDICINE CLINIC | Facility: CLINIC | Age: 25
End: 2025-07-14
Payer: COMMERCIAL

## 2025-07-14 VITALS
DIASTOLIC BLOOD PRESSURE: 74 MMHG | HEIGHT: 62 IN | OXYGEN SATURATION: 98 % | BODY MASS INDEX: 21.64 KG/M2 | HEART RATE: 80 BPM | WEIGHT: 117.6 LBS | SYSTOLIC BLOOD PRESSURE: 114 MMHG

## 2025-07-14 DIAGNOSIS — M62.89 PELVIC FLOOR DYSFUNCTION: ICD-10-CM

## 2025-07-14 DIAGNOSIS — G43.719 INTRACTABLE CHRONIC MIGRAINE WITHOUT AURA AND WITHOUT STATUS MIGRAINOSUS: ICD-10-CM

## 2025-07-14 DIAGNOSIS — M54.2 NECK PAIN: ICD-10-CM

## 2025-07-14 DIAGNOSIS — F43.10 POSTTRAUMATIC STRESS DISORDER: Primary | ICD-10-CM

## 2025-07-14 DIAGNOSIS — N39.0 FREQUENT UTI: ICD-10-CM

## 2025-07-14 DIAGNOSIS — F98.8 ATTENTION DEFICIT DISORDER (ADD) WITHOUT HYPERACTIVITY: ICD-10-CM

## 2025-07-14 DIAGNOSIS — K21.9 GASTROESOPHAGEAL REFLUX DISEASE WITHOUT ESOPHAGITIS: ICD-10-CM

## 2025-07-14 PROBLEM — R11.0 NAUSEA: Status: RESOLVED | Noted: 2023-05-22 | Resolved: 2025-07-14

## 2025-07-14 PROBLEM — Z34.80 INTRAUTERINE PREGNANCY IN TEENAGER: Status: RESOLVED | Noted: 2019-11-08 | Resolved: 2025-07-14

## 2025-07-14 PROBLEM — R10.11 RIGHT UPPER QUADRANT ABDOMINAL PAIN: Status: RESOLVED | Noted: 2023-05-22 | Resolved: 2025-07-14

## 2025-07-14 PROBLEM — R06.09 DYSPNEA ON EXERTION: Status: RESOLVED | Noted: 2022-11-30 | Resolved: 2025-07-14

## 2025-07-14 PROBLEM — J06.9 URI, ACUTE: Status: RESOLVED | Noted: 2017-12-07 | Resolved: 2025-07-14

## 2025-07-14 PROBLEM — Z00.00 HEALTHCARE MAINTENANCE: Status: RESOLVED | Noted: 2019-11-08 | Resolved: 2025-07-14

## 2025-07-14 PROBLEM — Z13.6 SCREENING FOR CARDIOVASCULAR CONDITION: Status: RESOLVED | Noted: 2024-09-09 | Resolved: 2025-07-14

## 2025-07-14 PROBLEM — K59.09 CONSTIPATION, CHRONIC: Status: RESOLVED | Noted: 2023-03-15 | Resolved: 2025-07-14

## 2025-07-14 PROBLEM — D22.9 CHANGE IN NEVUS: Status: RESOLVED | Noted: 2024-09-09 | Resolved: 2025-07-14

## 2025-07-14 LAB
BILIRUB BLD-MCNC: ABNORMAL MG/DL
CLARITY, POC: CLEAR
COLOR UR: ABNORMAL
EXPIRATION DATE: ABNORMAL
GLUCOSE UR STRIP-MCNC: NEGATIVE MG/DL
KETONES UR QL: NEGATIVE
LEUKOCYTE EST, POC: ABNORMAL
Lab: ABNORMAL
NITRITE UR-MCNC: POSITIVE MG/ML
PH UR: 6 [PH] (ref 5–8)
PROT UR STRIP-MCNC: NEGATIVE MG/DL
RBC # UR STRIP: ABNORMAL /UL
SP GR UR: 1.01 (ref 1–1.03)
UROBILINOGEN UR QL: ABNORMAL

## 2025-07-14 PROCEDURE — 99214 OFFICE O/P EST MOD 30 MIN: CPT | Performed by: STUDENT IN AN ORGANIZED HEALTH CARE EDUCATION/TRAINING PROGRAM

## 2025-07-14 PROCEDURE — 81003 URINALYSIS AUTO W/O SCOPE: CPT | Performed by: STUDENT IN AN ORGANIZED HEALTH CARE EDUCATION/TRAINING PROGRAM

## 2025-07-14 RX ORDER — OMEPRAZOLE 40 MG/1
40 CAPSULE, DELAYED RELEASE ORAL DAILY
Qty: 90 CAPSULE | Refills: 3 | Status: SHIPPED | OUTPATIENT
Start: 2025-07-14

## 2025-07-14 RX ORDER — DEXTROAMPHETAMINE SACCHARATE, AMPHETAMINE ASPARTATE MONOHYDRATE, DEXTROAMPHETAMINE SULFATE AND AMPHETAMINE SULFATE 5; 5; 5; 5 MG/1; MG/1; MG/1; MG/1
20 CAPSULE, EXTENDED RELEASE ORAL EVERY MORNING
Qty: 30 CAPSULE | Refills: 0 | Status: SHIPPED | OUTPATIENT
Start: 2025-07-14

## 2025-07-14 RX ORDER — NITROFURANTOIN 25; 75 MG/1; MG/1
100 CAPSULE ORAL 2 TIMES DAILY
Qty: 10 CAPSULE | Refills: 0 | Status: SHIPPED | OUTPATIENT
Start: 2025-07-14

## 2025-07-14 RX ORDER — OMEPRAZOLE 40 MG/1
40 CAPSULE, DELAYED RELEASE ORAL DAILY
Qty: 90 CAPSULE | Refills: 0 | OUTPATIENT
Start: 2025-07-14

## 2025-07-14 NOTE — PROGRESS NOTES
Chief Complaint  Establish Care    Subjective        Mayo Dietrich presents to [facility]    History of Present Illness  The patient presents for evaluation of acid reflux, ADD, recurrent UTIs, and neck pain.    She has been under the care of Dr. Irving since her late teens. She experiences heartburn a few times a week but does not report any vomiting. She avoids caffeine due to her Adderall use but consumes snacks before bedtime. She has been on omeprazole for approximately 2 years, which was prescribed following an endoscopy in 2023 that revealed reflux with esophagitis without bleeding. She has not taken omeprazole for several months as she needs to consult her gastroenterologist for a refill.    She was diagnosed with ADD in October 2024 at [facility], where she underwent testing. Initially, she was prescribed Adderall 10 mg, which provided some relief but was insufficient. The dosage was then increased to 20 mg, which has been effective. Prior to the diagnosis, she experienced symptoms such as procrastination, fatigue, difficulty focusing on work, emotional overload, and lack of emotional control. These symptoms have significantly improved with the medication. She is currently on Adderall XR 20 mg.    She reports frequent UTIs, a problem that began after the birth of her second child 3 years ago when her urethra was stitched closed. She occasionally feels incomplete bladder emptying, which she suspects may be contributing to her recurrent UTIs. Over the past year, she has had 4 or 5 UTIs. She has not undergone pelvic floor physical therapy post-childbirth. She attempts to urinate before and after intercourse. She describes the frustration of needing to concentrate on fully emptying her bladder, often requiring position changes. She also experiences burning and back pain. Nitrofurantoin has been effective in treating her UTIs in the past. She has been taking Azo, which has turned  "her urine orange.    About 2 months ago, she noticed consistent neck pain and a knot in the area. The pain is described as tension-like, similar to the discomfort felt after looking up for an extended period and then looking down. The knot has slightly enlarged and is only palpable when her neck is positioned in a certain way. She also experiences occasional shocking, numbing pain that radiates through her jawline when she moves her head in a certain way. This pain lasts for a few seconds and is followed by a dull, aching headache.    She has irritable bowel syndrome with constipation primarily. She has GERD. She had a vaginal delivery and preeclampsia. She has PTSD. She has chronic migraines and does not get an aura. She has insomnia here and there. She is on Adderall XR 20 mg, Depo-Provera shot, omeprazole, and trazodone as needed.    Social History:  Occupations: Works from home  Diet: Avoids caffeine, consumes snacks before bedtime  Coffee/Tea/Caffeine-containing Drinks: Avoids caffeine  Sexual Practices: Attempts to urinate before and after intercourse    PAST SURGICAL HISTORY:  Endoscopy in 2023  Urethra stitched closed after the birth of her second child       Objective   Vital Signs:  /74 (BP Location: Left arm, Patient Position: Sitting, Cuff Size: Adult)   Pulse 80   Ht 157.5 cm (62.01\")   Wt 53.3 kg (117 lb 9.6 oz)   SpO2 98%   BMI 21.50 kg/m²   Estimated body mass index is 21.5 kg/m² as calculated from the following:    Height as of this encounter: 157.5 cm (62.01\").    Weight as of this encounter: 53.3 kg (117 lb 9.6 oz).    BMI is within normal parameters. No other follow-up for BMI required.      Physical Exam  Vitals and nursing note reviewed.   Constitutional:       General: She is not in acute distress.     Appearance: Normal appearance. She is not ill-appearing.   HENT:      Head: Normocephalic and atraumatic.      Nose: Nose normal.      Mouth/Throat:      Mouth: Mucous membranes are " moist.   Eyes:      Extraocular Movements: Extraocular movements intact.      Conjunctiva/sclera: Conjunctivae normal.   Cardiovascular:      Rate and Rhythm: Normal rate and regular rhythm.      Heart sounds: Normal heart sounds. No murmur heard.     No gallop.   Pulmonary:      Effort: Pulmonary effort is normal. No respiratory distress.      Breath sounds: Normal breath sounds. No stridor. No wheezing, rhonchi or rales.   Chest:      Chest wall: No tenderness.   Skin:     General: Skin is warm and dry.   Neurological:      General: No focal deficit present.      Mental Status: She is alert and oriented to person, place, and time. Mental status is at baseline.   Psychiatric:         Mood and Affect: Mood normal.         Behavior: Behavior normal.          Physical Exam  Neck: Palpable lymph node on the right side of the neck, described as a knot that has increased in size.  Respiratory: Clear to auscultation, no wheezing, rales or rhonchi       Result Review :               Results  Diagnostic Testing   - Endoscopy: 2023, Reflux with esophagitis without bleeding          Assessment and Plan   Diagnoses and all orders for this visit:    1. Posttraumatic stress disorder (Primary)    2. Attention deficit disorder (ADD) without hyperactivity  -     amphetamine-dextroamphetamine XR (ADDERALL XR) 20 MG 24 hr capsule; Take 1 capsule by mouth Every Morning  Dispense: 30 capsule; Refill: 0    3. Intractable chronic migraine without aura and without status migrainosus    4. Gastroesophageal reflux disease without esophagitis  -     omeprazole (priLOSEC) 40 MG capsule; Take 1 capsule by mouth Daily.  Dispense: 90 capsule; Refill: 3    5. Frequent UTI  -     POCT urinalysis dipstick, automated  -     Urine Culture - Urine, Urine, Clean Catch  -     Ambulatory Referral to Physical Therapy for Evaluation & Treatment  -     nitrofurantoin, macrocrystal-monohydrate, (Macrobid) 100 MG capsule; Take 1 capsule by mouth 2 (Two) Times  a Day.  Dispense: 10 capsule; Refill: 0  -     Cranberry-D Mannose 158-500 MG capsule; Take 1 each by mouth Daily.  Dispense: 90 capsule; Refill: 3    6. Pelvic floor dysfunction  -     POCT urinalysis dipstick, automated  -     Urine Culture - Urine, Urine, Clean Catch  -     Ambulatory Referral to Physical Therapy for Evaluation & Treatment  -     Ambulatory Referral to Physical Therapy for Evaluation & Treatment    7. Neck pain  -     XR Spine Cervical Complete 4 or 5 View (In Office)        Assessment & Plan  1. Acid Reflux.  - Reports experiencing heartburn a few times a week but no vomiting.  - Has been off omeprazole for a few months due to needing a refill from her gastroenterologist.  - Dietary modifications such as avoiding citrus, caffeine, spicy foods, and late-night eating were recommended.  - A prescription for omeprazole will be sent to her pharmacy.    2. Attention Deficit Disorder (ADD) without hyperactivity.  - Diagnosed with ADD in 10/2024 and has been on Adderall XR 20 mg, which has worked well for her.  - A new contract will be established as she transitions to this provider for her controlled medication.  - Symptoms such as procrastination, fatigue, and difficulty focusing have improved significantly.  - Current prescription for Adderall XR 20 mg will be continued and sent to her pharmacy.    3. Recurrent Urinary Tract Infections (UTIs).  - Reports having 4-5 UTIs in the past year and is currently experiencing symptoms of a UTI.  - A urinalysis and culture will be conducted today.  - A prescription for Macrobid will be sent to her pharmacy.  - If the culture reveals E. coli, an over-the-counter supplement containing D-mannose will be recommended to reduce recurrent E. coli infections. Hydration and regular restroom use were advised. A referral for pelvic floor physical therapy will be made.    4. Neck Pain.  - Reports consistent neck pain for the past two months, with a noticeable knot that has  increased in size.  - An x-ray of her neck will be ordered today to rule out nerve impingement.  - Ergonomic adjustments at home and work were suggested to improve posture and prevent further pain.  - If the x-ray shows no abnormalities, home stretches and neck rehabilitation exercises will be recommended. If these do not alleviate the pain, formal physical therapy may be considered.    5. Health Maintenance.  - Declined screening for chlamydia today.  - Last COVID-19 vaccine was in 2021, so she is due for another dose if she chooses to receive it.  - Last Pap smear was recent, with HPV negative but atypical cells present; a follow-up Pap smear is planned in a year.  - Last annual physical and blood work were conducted in 09/2024. An annual physical and blood work will be scheduled during her next visit in 3 months.    Follow-up  The patient will follow up in 3 months.            Follow Up   Return in about 3 months (around 10/14/2025) for Annual physical.  Patient was given instructions and counseling regarding her condition or for health maintenance advice. Please see specific information pulled into the AVS if appropriate.           Patient or patient representative verbalized consent for the use of Ambient Listening during the visit with  Jing Benjamin DO for chart documentation. 7/14/2025  09:24 EDT

## 2025-07-17 LAB
BACTERIA UR CULT: ABNORMAL
BACTERIA UR CULT: ABNORMAL
OTHER ANTIBIOTIC SUSC ISLT: ABNORMAL

## 2025-08-05 ENCOUNTER — HOSPITAL ENCOUNTER (OUTPATIENT)
Dept: PHYSICAL THERAPY | Facility: HOSPITAL | Age: 25
Setting detail: THERAPIES SERIES
Discharge: HOME | End: 2025-08-05
Payer: COMMERCIAL

## 2025-08-05 DIAGNOSIS — R33.9 INCOMPLETE BLADDER EMPTYING: ICD-10-CM

## 2025-08-05 DIAGNOSIS — N39.3 PRIMARY STRESS URINARY INCONTINENCE: ICD-10-CM

## 2025-08-05 DIAGNOSIS — M62.89 PELVIC FLOOR DYSFUNCTION: Primary | ICD-10-CM

## 2025-08-05 DIAGNOSIS — R10.2 PELVIC PAIN: ICD-10-CM

## 2025-08-05 PROCEDURE — 97161 PT EVAL LOW COMPLEX 20 MIN: CPT
